# Patient Record
Sex: MALE | Race: WHITE | NOT HISPANIC OR LATINO | Employment: OTHER | URBAN - METROPOLITAN AREA
[De-identification: names, ages, dates, MRNs, and addresses within clinical notes are randomized per-mention and may not be internally consistent; named-entity substitution may affect disease eponyms.]

---

## 2017-08-04 ENCOUNTER — OFFICE VISIT (OUTPATIENT)
Dept: URGENT CARE | Facility: CLINIC | Age: 52
End: 2017-08-04
Payer: MEDICARE

## 2017-08-04 VITALS
HEIGHT: 68 IN | OXYGEN SATURATION: 97 % | DIASTOLIC BLOOD PRESSURE: 83 MMHG | SYSTOLIC BLOOD PRESSURE: 127 MMHG | TEMPERATURE: 98 F | WEIGHT: 260 LBS | BODY MASS INDEX: 39.4 KG/M2 | HEART RATE: 91 BPM

## 2017-08-04 DIAGNOSIS — L23.7 CONTACT DERMATITIS DUE TO POISON IVY: Primary | ICD-10-CM

## 2017-08-04 PROCEDURE — 99203 OFFICE O/P NEW LOW 30 MIN: CPT | Mod: 25,S$GLB,, | Performed by: PHYSICIAN ASSISTANT

## 2017-08-04 PROCEDURE — 96372 THER/PROPH/DIAG INJ SC/IM: CPT | Mod: S$GLB,,, | Performed by: PHYSICIAN ASSISTANT

## 2017-08-04 RX ORDER — TRIAMCINOLONE ACETONIDE 1 MG/G
CREAM TOPICAL 2 TIMES DAILY
Qty: 80 G | Refills: 1 | Status: SHIPPED | OUTPATIENT
Start: 2017-08-04 | End: 2017-08-14

## 2017-08-04 RX ORDER — BETAMETHASONE SODIUM PHOSPHATE AND BETAMETHASONE ACETATE 3; 3 MG/ML; MG/ML
9 INJECTION, SUSPENSION INTRA-ARTICULAR; INTRALESIONAL; INTRAMUSCULAR; SOFT TISSUE
Status: COMPLETED | OUTPATIENT
Start: 2017-08-04 | End: 2017-08-04

## 2017-08-04 RX ORDER — PREDNISONE 20 MG/1
20 TABLET ORAL DAILY
Qty: 14 TABLET | Refills: 0 | Status: SHIPPED | OUTPATIENT
Start: 2017-08-04 | End: 2017-08-16

## 2017-08-04 RX ADMIN — BETAMETHASONE SODIUM PHOSPHATE AND BETAMETHASONE ACETATE 9 MG: 3; 3 INJECTION, SUSPENSION INTRA-ARTICULAR; INTRALESIONAL; INTRAMUSCULAR; SOFT TISSUE at 01:08

## 2017-08-04 NOTE — PATIENT INSTRUCTIONS
Poison Ivy Rash  You have a rash and itching. This is a delayed reaction to the oils of the poison ivy plant. You likely came in contact with it during the 3 days before your symptoms began. Your skin will become red and itchy. Small blisters may appear. These can break and leak a clear yellow fluid. This fluid is not contagious. The reaction usually starts to go away after 1 to 2 weeks. But it may take 4 to 6 weeks to fully clear.    Home care  Follow these guidelines when caring for yourself at home:  · The plant oils still on your skin or clothes can be spread to other places on your body. They can also be passed on to other people and cause a similar reaction. Thats why its important to wash all of the plant oils off your skin and any clothes that may have been exposed. Wash all clothes that you were wearing. Use hot water with ordinary laundry detergent.  · Don't use over-the-counter creams that have neomycin or bacitracin. These may make the rash worse.  · Avoid anything that heats up your skin. This includes hot showers or baths, or direct sunlight. These can make itching worse.  · Put a cold compress on areas that are leaking (weeping), or on blistered areas. Do this for 30 minutes 3 times a day. To make a cold compress, dip a wash cloth in a mixture of 1 pint of cold water and 1 packet of astringent or oatmeal bath powder. Keep the solution in the refrigerator for future use.  · If large areas of skin are affected, take a lukewarm bath. Add colloidal oatmeal, or 1 cup of cornstarch or baking soda to the water.  · For a rash in a smaller area, use hydrocortisone cream for redness and irritation. But dont use this if another medicine was prescribed. For severe itching, put an ice pack on the area. To make an ice pack, put ice cubes in a plastic bag that seals at the top. Wrap the bag in a clean, thin towel or cloth. Never put ice or an ice pack directly on the skin. Over-the-counter products that have  calamine lotion may also be helpful.  · You can also use an oral antihistamine medicine with diphenhydramine for itching, unless another medicine was prescribed. This medicine may make you sleepy. So use lower doses during the daytime and higher doses at bedtime. Dont use medicine that has diphenhydramine if you have glaucoma. Also dont use it if you are a man who has trouble urinating because of an enlarged prostate. Antihistamines with loratidine cause less drowsiness. They are a good choice for daytime use.  · For severe cases, your provider may prescribe oral steroid medicines. Always take these exactly as prescribed.  Follow-up care  Follow up with your healthcare provider, or as directed. Call your provider if your rash gets worse or you are not starting to get better after 1 week of treatment.  When to seek medical advice  Call your healthcare provider right away if any of these occur:  · Spreading facial rash with swollen mouth or eyelids  · Rash that spreads to the groin and causes swelling of the penis, scrotum, or vaginal area  · Trouble urinating because of swelling in the genital area  Also call your provider if you have signs of infection in the areas of broken blisters:  · Spreading redness  · Pus or fluid draining from the blisters  · Yellow-brown crusts form over the open blisters  · Fever of 1 degree, or higher, above your normal temperature, or as directed by your provider  Call 911  Call 911 if you have severe swelling on your face, eyelids, mouth, throat, or tongue.  Date Last Reviewed: 8/1/2016  © 8973-0561 [employer]. [address] [place] [address], [place] [address]. All rights reserved. This information is not intended as a substitute for professional medical care. Always follow your healthcare professional's instructions.      Please follow up with your Primary care provider within 2-5 days if your signs ans symptoms have not resolved or worsen.     If your condition worsens or  fails to improve we recommend that you receive another evaluation at the emergency room immediately or contact your primary medical clinic to discuss your concerns.   You must understand that you have received an Urgent Care treatment only and that you may be released before all of your medical problems are known or treated. You, the patient, will arrange for follow up care as instructed.

## 2017-08-04 NOTE — PROGRESS NOTES
"Subjective:       Patient ID: Maximino Marinelli is a 51 y.o. male.    Vitals:  height is 5' 8" (1.727 m) and weight is 117.9 kg (260 lb). His temperature is 98.4 °F (36.9 °C). His blood pressure is 127/83 and his pulse is 91. His oxygen saturation is 97%.     Chief Complaint: Rash    Rash   This is a new problem. The current episode started in the past 7 days. The problem has been gradually worsening since onset. The affected locations include the left hand, left arm, right hand, right arm, right ankle, left ankle, abdomen and back. The rash is characterized by itchiness. He was exposed to plant contact (poison ivy). Pertinent negatives include no diarrhea, facial edema, fatigue, fever, joint pain, shortness of breath, sore throat or vomiting. Past treatments include anti-itch cream, antibiotic cream and topical steroids. The treatment provided mild relief.     Review of Systems   Constitution: Negative for chills, fatigue and fever.   HENT: Negative for headaches and sore throat.    Eyes: Negative for blurred vision.   Cardiovascular: Negative for chest pain.   Respiratory: Negative for shortness of breath.    Skin: Positive for itching and rash.   Musculoskeletal: Negative for back pain and joint pain.   Gastrointestinal: Negative for abdominal pain, diarrhea, nausea and vomiting.   Psychiatric/Behavioral: The patient is not nervous/anxious.        Objective:      Physical Exam   Constitutional: He is oriented to person, place, and time. He appears well-developed and well-nourished. He is cooperative.  Non-toxic appearance. He does not appear ill. No distress.   HENT:   Head: Normocephalic and atraumatic. Head is without abrasion, without contusion and without laceration.   Right Ear: Hearing, tympanic membrane, external ear and ear canal normal.   Left Ear: Hearing, tympanic membrane, external ear and ear canal normal.   Nose: Nose normal. No mucosal edema, rhinorrhea or nasal deformity. No epistaxis. Right sinus " exhibits no maxillary sinus tenderness and no frontal sinus tenderness. Left sinus exhibits no maxillary sinus tenderness and no frontal sinus tenderness.   Mouth/Throat: Uvula is midline, oropharynx is clear and moist and mucous membranes are normal. No trismus in the jaw. Normal dentition. No uvula swelling. No posterior oropharyngeal erythema.   Eyes: Conjunctivae, EOM and lids are normal. Pupils are equal, round, and reactive to light. Right eye exhibits no discharge. Left eye exhibits no discharge. No scleral icterus.   Sclera clear bilat   Neck: Trachea normal, normal range of motion, full passive range of motion without pain and phonation normal. Neck supple.   Cardiovascular: Normal rate, regular rhythm, normal heart sounds, intact distal pulses and normal pulses.    Pulmonary/Chest: Effort normal and breath sounds normal. No stridor. No respiratory distress.   Abdominal: Soft. Normal appearance and bowel sounds are normal. He exhibits no distension, no pulsatile midline mass and no mass. There is no tenderness.   Musculoskeletal: Normal range of motion. He exhibits no edema or deformity.   Neurological: He is alert and oriented to person, place, and time. He exhibits normal muscle tone. Coordination normal.   Skin: Skin is warm, dry and intact. Capillary refill takes less than 2 seconds. Rash noted. No abrasion, no bruising, no burn, no ecchymosis, no laceration, no lesion and no purpura noted. Rash is maculopapular and vesicular. Rash is not macular, not papular, not nodular, not pustular and not urticarial. He is not diaphoretic. No erythema. No pallor.        Several sporadic regions of erythematous vesicular rashes with severe prurititis with oozing     No edema or warmth. No facial involvement. No signs of infections or purulence.    Psychiatric: He has a normal mood and affect. His speech is normal and behavior is normal. Judgment and thought content normal. Cognition and memory are normal.   Nursing  note and vitals reviewed.      Assessment:       1. Contact dermatitis due to poison ivy        Plan:         Contact dermatitis due to poison ivy  -     betamethasone acetate-betamethasone sodium phosphate injection 9 mg; Inject 1.5 mLs (9 mg total) into the muscle one time.  -     triamcinolone acetonide 0.1% (KENALOG) 0.1 % cream; Apply topically 2 (two) times daily.  Dispense: 80 g; Refill: 1  -     predniSONE (DELTASONE) 20 MG tablet; Take 1 tablet (20 mg total) by mouth once daily. Take 2 tablets PO once daily for 4 days followed by 1 tablet PO once daily for 4 days followed by half a tablet PO once daily for 4 days  Dispense: 14 tablet; Refill: 0

## 2018-04-08 ENCOUNTER — HOSPITAL ENCOUNTER (EMERGENCY)
Facility: HOSPITAL | Age: 53
Discharge: HOME | End: 2018-04-08
Payer: MEDICARE

## 2018-04-08 VITALS
OXYGEN SATURATION: 97 % | HEIGHT: 68 IN | WEIGHT: 275 LBS | DIASTOLIC BLOOD PRESSURE: 70 MMHG | RESPIRATION RATE: 18 BRPM | TEMPERATURE: 97 F | BODY MASS INDEX: 41.68 KG/M2 | HEART RATE: 82 BPM | SYSTOLIC BLOOD PRESSURE: 132 MMHG

## 2018-04-08 DIAGNOSIS — S61.215A LACERATION OF LEFT RING FINGER WITHOUT FOREIGN BODY WITHOUT DAMAGE TO NAIL, INITIAL ENCOUNTER: Primary | ICD-10-CM

## 2018-04-08 PROCEDURE — 63600175 PHARM REV CODE 636 W HCPCS

## 2018-04-08 PROCEDURE — 90471 IMMUNIZATION ADMIN: CPT

## 2018-04-08 PROCEDURE — 99284 EMERGENCY DEPT VISIT MOD MDM: CPT | Mod: 25

## 2018-04-08 PROCEDURE — 12041 INTMD RPR N-HF/GENIT 2.5CM/<: CPT | Mod: F3

## 2018-04-08 PROCEDURE — 90715 TDAP VACCINE 7 YRS/> IM: CPT

## 2018-04-08 PROCEDURE — 25000003 PHARM REV CODE 250

## 2018-04-08 RX ORDER — CEPHALEXIN 500 MG/1
500 CAPSULE ORAL 4 TIMES DAILY
Qty: 20 CAPSULE | Refills: 0 | Status: SHIPPED | OUTPATIENT
Start: 2018-04-08 | End: 2018-04-13

## 2018-04-08 RX ORDER — LIDOCAINE HYDROCHLORIDE AND EPINEPHRINE 10; 10 MG/ML; UG/ML
1 INJECTION, SOLUTION INFILTRATION; PERINEURAL ONCE
Status: DISCONTINUED | OUTPATIENT
Start: 2018-04-08 | End: 2018-04-08

## 2018-04-08 RX ORDER — LIDOCAINE HYDROCHLORIDE AND EPINEPHRINE 10; 10 MG/ML; UG/ML
1 INJECTION, SOLUTION INFILTRATION; PERINEURAL ONCE
Status: COMPLETED | OUTPATIENT
Start: 2018-04-08 | End: 2018-04-08

## 2018-04-08 RX ORDER — HYDROCODONE BITARTRATE AND ACETAMINOPHEN 5; 325 MG/1; MG/1
1 TABLET ORAL
Status: COMPLETED | OUTPATIENT
Start: 2018-04-08 | End: 2018-04-08

## 2018-04-08 RX ORDER — IBUPROFEN 600 MG/1
600 TABLET ORAL EVERY 6 HOURS PRN
Qty: 20 TABLET | Refills: 0 | OUTPATIENT
Start: 2018-04-08 | End: 2018-10-14

## 2018-04-08 RX ORDER — HYDROCODONE BITARTRATE AND ACETAMINOPHEN 5; 325 MG/1; MG/1
1 TABLET ORAL EVERY 6 HOURS PRN
Qty: 12 TABLET | Refills: 0 | Status: SHIPPED | OUTPATIENT
Start: 2018-04-08 | End: 2018-10-14

## 2018-04-08 RX ADMIN — HYDROCODONE BITARTRATE AND ACETAMINOPHEN 1 TABLET: 5; 325 TABLET ORAL at 09:04

## 2018-04-08 RX ADMIN — LIDOCAINE HYDROCHLORIDE,EPINEPHRINE BITARTRATE 10 ML: 10; .01 INJECTION, SOLUTION INFILTRATION; PERINEURAL at 09:04

## 2018-04-08 RX ADMIN — BACITRACIN, NEOMYCIN, POLYMYXIN B 1 EACH: 400; 3.5; 5 OINTMENT TOPICAL at 10:04

## 2018-04-08 RX ADMIN — CLOSTRIDIUM TETANI TOXOID ANTIGEN (FORMALDEHYDE INACTIVATED), CORYNEBACTERIUM DIPHTHERIAE TOXOID ANTIGEN (FORMALDEHYDE INACTIVATED), BORDETELLA PERTUSSIS TOXOID ANTIGEN (GLUTARALDEHYDE INACTIVATED), BORDETELLA PERTUSSIS FILAMENTOUS HEMAGGLUTININ ANTIGEN (FORMALDEHYDE INACTIVATED), BORDETELLA PERTUSSIS PERTACTIN ANTIGEN, AND BORDETELLA PERTUSSIS FIMBRIAE 2/3 ANTIGEN 0.5 ML: 5; 2; 2.5; 5; 3; 5 INJECTION, SUSPENSION INTRAMUSCULAR at 09:04

## 2018-04-09 NOTE — ED PROVIDER NOTES
"Encounter Date: 4/8/2018    SCRIBE #1 NOTE: I, Domenic Pozo, am scribing for, and in the presence of,  Dr. Shaffer. I have scribed the entire note.       History     Chief Complaint   Patient presents with    Laceration     Patient presents to the ED with a laceration to the poster area of the 4th digit on the left hand. States having cut it accidentally while "cutting weeds".      Time patient was seen by the provider: 8:41 PM      The patient is a 52 y.o. male who presents to the ED with a complaint of laceration to the left ring finger by knife wound. He states that he was cutting weeds today and accidentally cut himself.  He attempted to control bleeding at home without success.  He does not recall his last tetanus shot.  No palliative or provocative factors except as noted.  Denies loss of function.          Review of patient's allergies indicates:  No Known Allergies  Past Medical History:   Diagnosis Date    Arthritis     Hypercholesteremia      Past Surgical History:   Procedure Laterality Date    ANKLE FUSION      carpel tunnel      HERNIA REPAIR      INGUINAL HERNIA REPAIR      KNEE ARTHROSCOPY Right     ORTHOPEDIC SURGERY       Family History   Problem Relation Age of Onset    Diabetes Father      Social History   Substance Use Topics    Smoking status: Former Smoker     Packs/day: 0.50     Years: 20.00     Start date: 6/16/1994     Quit date: 5/16/2014    Smokeless tobacco: Not on file    Alcohol use No     Review of Systems   Skin: Positive for wound (L ring finger).   All other systems reviewed and are negative.      Physical Exam     Initial Vitals [04/08/18 1953]   BP Pulse Resp Temp SpO2   131/84 97 18 98.3 °F (36.8 °C) 96 %      MAP       99.67         Physical Exam    Constitutional: He appears well-developed.   HENT:   Head: Normocephalic and atraumatic.   Eyes: EOM are normal.   Neck: Neck supple.   Pulmonary/Chest: No respiratory distress.   Abdominal: He exhibits no distension. "   Musculoskeletal:   2 cm avulsion to left ring finger across PIP joint.  distal neurovascular status intact   Neurological:   Intact to screening   Skin: Skin is dry.   Psychiatric: He has a normal mood and affect.         ED Course   Lac Repair  Date/Time: 4/8/2018 10:07 PM  Performed by: BILLY PEREZ  Authorized by: BILLY PEREZ   Location: L ring finger evulsion.  Laceration length: 2 cm  Foreign bodies: no foreign bodies  Tendon involvement: none  Nerve involvement: none  Vascular damage: no  Anesthesia: digital block (and local infiltration)    Anesthesia:  Local Anesthetic: lidocaine 1% with epinephrine  Anesthetic total: 5 mL  Patient sedated: no  Preparation: Patient was prepped and draped in the usual sterile fashion.  Irrigation solution: saline  Irrigation method: tap  Amount of cleaning: extensive  Debridement: none  Degree of undermining: none  Skin closure: 4-0 nylon  Number of sutures: 8  Technique: running  Approximation: close  Approximation difficulty: simple  Patient tolerance: Patient tolerated the procedure well with no immediate complications        Labs Reviewed - No data to display          Medical Decision Making:   ED Management:  9:45 PM Lac repair    10:11 PM  Patient is nontoxic appearing in the ED.  No persistent emergent issues detected.  Exam benign.  We will discharge home to follow up with PCP.  Patient will return to the ED as needed for any deterioration or any other concerns.  Verbal discharge instructions and return precautions given. Pt tolerated lac repair well. Good approximation noted. Pt to return in 14 days for suture removal or to return sooner for complications or other problems.                       Clinical Impression:   The encounter diagnosis was Laceration of left ring finger without foreign body without damage to nail, initial encounter.    Disposition:   Disposition: Discharged  Condition: Stable         I, Billy Perez personally performed the  services described in this documentation. All medical record entries made by the scribe were at my direction and in my presence.  I have reviewed the chart and agree that the record reflects my personal performance and is accurate and complete. Phan Shaffer M.D.                   Phan Shaffer MD  04/09/18 0512

## 2018-04-09 NOTE — ED TRIAGE NOTES
Patient in Exam room #11 AAOx3. Up in bed with visible laceration to left ringer finger of 4th digit to left hand. Awaiting Md.

## 2018-10-14 ENCOUNTER — HOSPITAL ENCOUNTER (EMERGENCY)
Facility: HOSPITAL | Age: 53
Discharge: HOME | End: 2018-10-14
Attending: EMERGENCY MEDICINE
Payer: MEDICARE

## 2018-10-14 VITALS
TEMPERATURE: 98 F | SYSTOLIC BLOOD PRESSURE: 122 MMHG | HEIGHT: 68 IN | RESPIRATION RATE: 18 BRPM | DIASTOLIC BLOOD PRESSURE: 85 MMHG | OXYGEN SATURATION: 99 % | HEART RATE: 96 BPM | BODY MASS INDEX: 43.19 KG/M2 | WEIGHT: 285 LBS

## 2018-10-14 DIAGNOSIS — M54.50 ACUTE LOW BACK PAIN WITHOUT SCIATICA, UNSPECIFIED BACK PAIN LATERALITY: ICD-10-CM

## 2018-10-14 DIAGNOSIS — S99.912A LEFT ANKLE INJURY, INITIAL ENCOUNTER: ICD-10-CM

## 2018-10-14 DIAGNOSIS — S92.002A CLOSED NONDISPLACED FRACTURE OF LEFT CALCANEUS, UNSPECIFIED PORTION OF CALCANEUS, INITIAL ENCOUNTER: Primary | ICD-10-CM

## 2018-10-14 DIAGNOSIS — M79.672 LEFT FOOT PAIN: ICD-10-CM
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
## 2018-10-14 PROCEDURE — 96372 THER/PROPH/DIAG INJ SC/IM: CPT | Mod: 59

## 2018-10-14 PROCEDURE — 96375 TX/PRO/DX INJ NEW DRUG ADDON: CPT | Mod: 59

## 2018-10-14 PROCEDURE — 96374 THER/PROPH/DIAG INJ IV PUSH: CPT | Mod: 59

## 2018-10-14 PROCEDURE — 63600175 PHARM REV CODE 636 W HCPCS: Performed by: EMERGENCY MEDICINE

## 2018-10-14 PROCEDURE — 29515 APPLICATION SHORT LEG SPLINT: CPT | Mod: LT

## 2018-10-14 PROCEDURE — 99284 EMERGENCY DEPT VISIT MOD MDM: CPT | Mod: 25

## 2018-10-14 RX ORDER — HALOPERIDOL 5 MG/ML
5 INJECTION INTRAMUSCULAR
Status: COMPLETED | OUTPATIENT
Start: 2018-10-14 | End: 2018-10-14

## 2018-10-14 RX ORDER — IBUPROFEN 600 MG/1
600 TABLET ORAL EVERY 6 HOURS PRN
Qty: 30 TABLET | Refills: 0 | Status: SHIPPED | OUTPATIENT
Start: 2018-10-14

## 2018-10-14 RX ORDER — HYDROCODONE BITARTRATE AND ACETAMINOPHEN 5; 325 MG/1; MG/1
TABLET ORAL
Qty: 30 TABLET | Refills: 0 | Status: SHIPPED | OUTPATIENT
Start: 2018-10-14

## 2018-10-14 RX ORDER — HYDROMORPHONE HYDROCHLORIDE 1 MG/ML
1 INJECTION, SOLUTION INTRAMUSCULAR; INTRAVENOUS; SUBCUTANEOUS
Status: COMPLETED | OUTPATIENT
Start: 2018-10-14 | End: 2018-10-14

## 2018-10-14 RX ORDER — KETOROLAC TROMETHAMINE 30 MG/ML
10 INJECTION, SOLUTION INTRAMUSCULAR; INTRAVENOUS
Status: COMPLETED | OUTPATIENT
Start: 2018-10-14 | End: 2018-10-14

## 2018-10-14 RX ADMIN — HALOPERIDOL LACTATE 5 MG: 5 INJECTION, SOLUTION INTRAMUSCULAR at 08:10

## 2018-10-14 RX ADMIN — KETOROLAC TROMETHAMINE 10 MG: 30 INJECTION, SOLUTION INTRAMUSCULAR at 08:10

## 2018-10-14 RX ADMIN — HYDROMORPHONE HYDROCHLORIDE 1 MG: 1 INJECTION, SOLUTION INTRAMUSCULAR; INTRAVENOUS; SUBCUTANEOUS at 06:10

## 2018-10-14 RX ADMIN — HYDROMORPHONE HYDROCHLORIDE 1 MG: 1 INJECTION, SOLUTION INTRAMUSCULAR; INTRAVENOUS; SUBCUTANEOUS at 08:10

## 2018-10-14 NOTE — ED PROVIDER NOTES
Encounter Date: 10/14/2018    SCRIBE #1 NOTE: I, Jazz Lentz, am scribing for, and in the presence of,  Dr. Oneal. I have scribed the entire note.       History     Chief Complaint   Patient presents with    Ankle Pain     left ankle occurred aprx 30mins pta fell off ladder landing on left ankle denies hitting head or loc +swelling -numbness-tingling     Maximino Marinelli is a 52 y.o. male who  has a past medical history of Arthritis and Hypercholesteremia.    The patient presents to the ED due to acute traumatic left ankle pain s/p fall. He reports onset of incident was just prior to arrival. The patient states he was standing at the top of a 20 foot ladder when he lost his balance while holding a branch. He notes he landed on his left foot. The patient denies any LOC or head injury associated with the fall. He notes associated swelling at the ankle and low back pain but denies any open wounds or redness. The patient denies use of any medications for pain.      The history is provided by the patient.     Review of patient's allergies indicates:  No Known Allergies  Past Medical History:   Diagnosis Date    Arthritis     Hypercholesteremia      Past Surgical History:   Procedure Laterality Date    ANKLE FUSION      ARTHROSCOPY-KNEE Left 3/16/2015    [facility]    ARTHROSCOPY-KNEE Right 6/18/2014    [facility]    carpel tunnel      HERNIA REPAIR      INGUINAL HERNIA REPAIR      KNEE ARTHROSCOPY Right     MENISCECTOMY Left 3/16/2015    [facility]    MENISCECTOMY Right 6/18/2014    [facility]    ORTHOPEDIC SURGERY      TRANSESOPHAGEAL ECHOCARDIOGRAM (XAVIER) N/A 12/9/2013    Performed by [facility]     Family History   Problem Relation Age of Onset    Diabetes Father      Social History     Tobacco Use    Smoking status: Former Smoker     Packs/day: 0.50     Years: 20.00  "    Pack years: 10.00     Start date: 1994     Last attempt to quit: 2014     Years since quittin.4   Substance Use Topics    Alcohol use: No    Drug use: Yes     Types: "Crack" cocaine, Marijuana, Other-see comments     Review of Systems   Musculoskeletal: Positive for arthralgias, back pain and joint swelling.        Left ankle pain and swelling   Skin: Negative for wound.   All other systems reviewed and are negative.      Physical Exam     Initial Vitals [10/14/18 1810]   BP Pulse Resp Temp SpO2   99/60 99 18 97.7 °F (36.5 °C) 99 %      MAP       --         Physical Exam    Nursing note and vitals reviewed.  Constitutional: He appears well-developed and well-nourished. He appears distressed.   HENT:   Head: Normocephalic and atraumatic.   Eyes: Conjunctivae are normal.   Neck: Normal range of motion.   Cardiovascular: Normal rate, regular rhythm, normal heart sounds and intact distal pulses.   No murmur heard.  Pulmonary/Chest: Breath sounds normal. No respiratory distress.   Abdominal: Soft. Bowel sounds are normal. He exhibits no distension. There is no tenderness.   Musculoskeletal: He exhibits edema and tenderness.   LLE: Bruising and swelling to the left medial ankle.  Unwilling to range ankle.  No gross deformity.  No proximal tibial tenderness with palpation.   Neurological: He is alert and oriented to person, place, and time. He has normal strength. No sensory deficit.   Skin: Skin is warm and dry.   Psychiatric: He has a normal mood and affect. His behavior is normal.         ED Course   Splint Application  Date/Time: 10/14/2018 9:19 PM  Performed by: Nori Oneal MD  Authorized by: Nori Oneal MD   Location details: left ankle  Splint type: short leg  Supplies used: aluminum splint,  cotton padding and Ortho-Glass  Post-procedure: The splinted body part was neurovascularly unchanged following the procedure.  Patient tolerance: Patient tolerated the procedure well with no immediate " complications        Labs Reviewed - No data to display       Imaging Results          X-Ray Foot Complete Left (Final result)  Result time 10/14/18 20:19:09    Final result by Ben Bland MD (10/14/18 20:19:09)                 Impression:      See above.      Electronically signed by: Ben Bland MD  Date:    10/14/2018  Time:    20:19             Narrative:    EXAMINATION:  XR FOOT COMPLETE 3 VIEW LEFT    CLINICAL HISTORY:  .  Pain in left foot    TECHNIQUE:  AP, lateral and oblique views of the left foot were performed.    COMPARISON:  19:14.    FINDINGS:  Redemonstration of acute comminuted calcaneal fracture.  No additional fractures or dislocation seen.  Lisfranc articulation appears congruent.  Prominent soft tissue swelling is seen about the ankle.                               X-Ray Ankle Complete Left (Final result)  Result time 10/14/18 19:40:51    Final result by Ben Bland MD (10/14/18 19:40:51)                 Impression:      Acute comminuted calcaneal fracture.      Electronically signed by: Ben Bland MD  Date:    10/14/2018  Time:    19:40             Narrative:    EXAMINATION:  XR ANKLE COMPLETE 3 VIEW LEFT    CLINICAL HISTORY:  Unspecified injury of left ankle, initial encounter    TECHNIQUE:  AP, lateral and oblique views of the left ankle were performed.    COMPARISON:  None    FINDINGS:  Acute comminuted fracture is seen involving the calcaneus with fracture planes likely extending to involve the subtalar joint and calcaneocuboid joint.  No evidence of distal tibia or fibular fracture.  Ankle mortise is maintained.  Prominent tissue swelling is seen about the ankle.  Posterior and plantar calcaneal spurring is also noted.                               X-Ray Lumbar Spine Ap And Lateral (Final result)  Result time 10/14/18 19:42:22    Final result by Ben Bland MD (10/14/18 19:42:22)                 Impression:      No acute lumbar spine abnormalities  identified.      Electronically signed by: Ben Bland MD  Date:    10/14/2018  Time:    19:42             Narrative:    EXAMINATION:  XR LUMBAR SPINE AP AND LATERAL    CLINICAL HISTORY:  fall with low back pain;Unspecified fall, initial encounter    TECHNIQUE:  AP, lateral and spot images were performed of the lumbar spine.    COMPARISON:  None    FINDINGS:  Lumbar spine alignment is within normal limits.  No evidence of acute lumbar spine fracture or subluxation.  Prominent anterior bridging marginal osteophytes are seen throughout the lower visualized thoracic spine and lumbar levels.  There is lower lumbar facet arthropathy also seen.  Visualized sacrum shows no significant abnormalities.                                 Medical Decision Making:   Clinical Tests:   Radiological Study: Ordered and Reviewed  ED Management:  52M with fall from ladder with lower back pain and left ankle pain.  NVI.  Xrays show left calcaneal fracture only.  No lumbar fractures.  Will splint and send home with pain meds with referral to Ortho for follow up.                      Clinical Impression:     1. Closed nondisplaced fracture of left calcaneus, unspecified portion of calcaneus, initial encounter    2. Left ankle injury, initial encounter    3. Fall    4. Left foot pain    5. Acute low back pain without sciatica, unspecified back pain laterality           I, Dr. Nori Oneal, personally performed the services described in this documentation.   All medical record entries made by the scribe were at my direction and in my presence.   I have reviewed the chart and agree that the record is accurate and complete.   Nori Oneal MD.  9:19 PM 10/14/2018                       Nori Oneal MD  10/14/18 1765

## 2018-10-15 NOTE — ED NOTES
Report received, pt care assumed. Pt thrashing in bed, kicking left leg in air c/o pain 10/10 to ankle. Dr. Oneal notified. Pt and family updated on result wait times and plan of care. Will continue to monitor.

## 2018-10-15 NOTE — DISCHARGE INSTRUCTIONS
Elevate and ice foot.  Take medications as directed.  No weight bearing on foot.  FOllow up with your Ortho doctor this week.

## 2018-10-15 NOTE — ED NOTES
Short leg splint applied to LLE, pt tolerated well. Dr. Oneal at bedside to evaluate splint. Splint approved by MD. Pt provided with crutches and teaching.

## 2024-06-03 ENCOUNTER — HOSPITAL ENCOUNTER (INPATIENT)
Facility: HOSPITAL | Age: 59
LOS: 1 days | Discharge: HOME | DRG: 603 | End: 2024-06-05
Attending: FAMILY MEDICINE | Admitting: HOSPITALIST
Payer: MEDICARE

## 2024-06-03 DIAGNOSIS — R60.0 EDEMA OF RIGHT LOWER EXTREMITY: ICD-10-CM

## 2024-06-03 DIAGNOSIS — L03.115 CELLULITIS OF RIGHT LOWER EXTREMITY: Primary | ICD-10-CM

## 2024-06-03 DIAGNOSIS — M79.3 LIPODERMATOSCLEROSIS OF BOTH LOWER EXTREMITIES: ICD-10-CM

## 2024-06-03 DIAGNOSIS — L03.90 WOUND CELLULITIS: ICD-10-CM

## 2024-06-03 DIAGNOSIS — S81.801A WOUND OF RIGHT LEG: ICD-10-CM

## 2024-06-03 LAB
ALBUMIN SERPL BCP-MCNC: 3.4 G/DL (ref 3.5–5.2)
ALP SERPL-CCNC: 78 U/L (ref 55–135)
ALT SERPL W/O P-5'-P-CCNC: 10 U/L (ref 10–44)
AMPHET+METHAMPHET UR QL: NEGATIVE
ANION GAP SERPL CALC-SCNC: 9 MMOL/L (ref 8–16)
AST SERPL-CCNC: 12 U/L (ref 10–40)
BARBITURATES UR QL SCN>200 NG/ML: NEGATIVE
BASOPHILS # BLD AUTO: 0.02 K/UL (ref 0–0.2)
BASOPHILS NFR BLD: 0.2 % (ref 0–1.9)
BENZODIAZ UR QL SCN>200 NG/ML: ABNORMAL
BILIRUB SERPL-MCNC: 0.5 MG/DL (ref 0.1–1)
BILIRUB UR QL STRIP: NEGATIVE
BUN SERPL-MCNC: 10 MG/DL (ref 6–20)
BZE UR QL SCN: ABNORMAL
CALCIUM SERPL-MCNC: 10 MG/DL (ref 8.7–10.5)
CANNABINOIDS UR QL SCN: NEGATIVE
CHLORIDE SERPL-SCNC: 102 MMOL/L (ref 95–110)
CLARITY UR: CLEAR
CO2 SERPL-SCNC: 29 MMOL/L (ref 23–29)
COLOR UR: YELLOW
CREAT SERPL-MCNC: 1 MG/DL (ref 0.5–1.4)
CREAT UR-MCNC: 97.5 MG/DL (ref 23–375)
CRP SERPL-MCNC: 49.8 MG/L (ref 0–8.2)
DIFFERENTIAL METHOD BLD: ABNORMAL
EOSINOPHIL # BLD AUTO: 0.1 K/UL (ref 0–0.5)
EOSINOPHIL NFR BLD: 0.8 % (ref 0–8)
ERYTHROCYTE [DISTWIDTH] IN BLOOD BY AUTOMATED COUNT: 12.9 % (ref 11.5–14.5)
EST. GFR  (NO RACE VARIABLE): >60 ML/MIN/1.73 M^2
GLUCOSE SERPL-MCNC: 136 MG/DL (ref 70–110)
GLUCOSE UR QL STRIP: NEGATIVE
HCT VFR BLD AUTO: 47.5 % (ref 40–54)
HGB BLD-MCNC: 15.9 G/DL (ref 14–18)
HGB UR QL STRIP: NEGATIVE
IMM GRANULOCYTES # BLD AUTO: 0.02 K/UL (ref 0–0.04)
IMM GRANULOCYTES NFR BLD AUTO: 0.2 % (ref 0–0.5)
KETONES UR QL STRIP: NEGATIVE
LACTATE SERPL-SCNC: 1.3 MMOL/L (ref 0.5–2.2)
LEUKOCYTE ESTERASE UR QL STRIP: NEGATIVE
LYMPHOCYTES # BLD AUTO: 1.2 K/UL (ref 1–4.8)
LYMPHOCYTES NFR BLD: 13.7 % (ref 18–48)
MCH RBC QN AUTO: 29.4 PG (ref 27–31)
MCHC RBC AUTO-ENTMCNC: 33.5 G/DL (ref 32–36)
MCV RBC AUTO: 88 FL (ref 82–98)
METHADONE UR QL SCN>300 NG/ML: NEGATIVE
MONOCYTES # BLD AUTO: 0.7 K/UL (ref 0.3–1)
MONOCYTES NFR BLD: 8.3 % (ref 4–15)
NEUTROPHILS # BLD AUTO: 6.8 K/UL (ref 1.8–7.7)
NEUTROPHILS NFR BLD: 76.8 % (ref 38–73)
NITRITE UR QL STRIP: NEGATIVE
NRBC BLD-RTO: 0 /100 WBC
OPIATES UR QL SCN: NEGATIVE
PCP UR QL SCN>25 NG/ML: NEGATIVE
PH UR STRIP: 8 [PH] (ref 5–8)
PLATELET # BLD AUTO: 287 K/UL (ref 150–450)
PMV BLD AUTO: 9 FL (ref 9.2–12.9)
POCT GLUCOSE: 122 MG/DL (ref 70–110)
POTASSIUM SERPL-SCNC: 4.2 MMOL/L (ref 3.5–5.1)
PROT SERPL-MCNC: 7.8 G/DL (ref 6–8.4)
PROT UR QL STRIP: NEGATIVE
RBC # BLD AUTO: 5.41 M/UL (ref 4.6–6.2)
SODIUM SERPL-SCNC: 140 MMOL/L (ref 136–145)
SP GR UR STRIP: 1.01 (ref 1–1.03)
TOXICOLOGY INFORMATION: ABNORMAL
URN SPEC COLLECT METH UR: NORMAL
UROBILINOGEN UR STRIP-ACNC: NEGATIVE EU/DL
WBC # BLD AUTO: 8.79 K/UL (ref 3.9–12.7)

## 2024-06-03 PROCEDURE — 87040 BLOOD CULTURE FOR BACTERIA: CPT

## 2024-06-03 PROCEDURE — 83605 ASSAY OF LACTIC ACID: CPT

## 2024-06-03 PROCEDURE — 25000003 PHARM REV CODE 250: Performed by: STUDENT IN AN ORGANIZED HEALTH CARE EDUCATION/TRAINING PROGRAM

## 2024-06-03 PROCEDURE — G0378 HOSPITAL OBSERVATION PER HR: HCPCS

## 2024-06-03 PROCEDURE — 96366 THER/PROPH/DIAG IV INF ADDON: CPT

## 2024-06-03 PROCEDURE — 87186 SC STD MICRODIL/AGAR DIL: CPT

## 2024-06-03 PROCEDURE — 80307 DRUG TEST PRSMV CHEM ANLYZR: CPT

## 2024-06-03 PROCEDURE — 85025 COMPLETE CBC W/AUTO DIFF WBC: CPT

## 2024-06-03 PROCEDURE — 96365 THER/PROPH/DIAG IV INF INIT: CPT

## 2024-06-03 PROCEDURE — 99285 EMERGENCY DEPT VISIT HI MDM: CPT | Mod: 25

## 2024-06-03 PROCEDURE — 25000003 PHARM REV CODE 250: Performed by: FAMILY MEDICINE

## 2024-06-03 PROCEDURE — 87040 BLOOD CULTURE FOR BACTERIA: CPT | Mod: 59 | Performed by: STUDENT IN AN ORGANIZED HEALTH CARE EDUCATION/TRAINING PROGRAM

## 2024-06-03 PROCEDURE — 96372 THER/PROPH/DIAG INJ SC/IM: CPT | Performed by: STUDENT IN AN ORGANIZED HEALTH CARE EDUCATION/TRAINING PROGRAM

## 2024-06-03 PROCEDURE — 63600175 PHARM REV CODE 636 W HCPCS: Performed by: STUDENT IN AN ORGANIZED HEALTH CARE EDUCATION/TRAINING PROGRAM

## 2024-06-03 PROCEDURE — 87070 CULTURE OTHR SPECIMN AEROBIC: CPT

## 2024-06-03 PROCEDURE — 86140 C-REACTIVE PROTEIN: CPT

## 2024-06-03 PROCEDURE — 80053 COMPREHEN METABOLIC PANEL: CPT

## 2024-06-03 PROCEDURE — 63600175 PHARM REV CODE 636 W HCPCS: Performed by: FAMILY MEDICINE

## 2024-06-03 PROCEDURE — 81003 URINALYSIS AUTO W/O SCOPE: CPT | Mod: 59 | Performed by: STUDENT IN AN ORGANIZED HEALTH CARE EDUCATION/TRAINING PROGRAM

## 2024-06-03 PROCEDURE — 87077 CULTURE AEROBIC IDENTIFY: CPT

## 2024-06-03 RX ORDER — LORAZEPAM 1 MG/1
2 TABLET ORAL EVERY 4 HOURS PRN
Status: DISCONTINUED | OUTPATIENT
Start: 2024-06-03 | End: 2024-06-04

## 2024-06-03 RX ORDER — SODIUM CHLORIDE 0.9 % (FLUSH) 0.9 %
5 SYRINGE (ML) INJECTION
Status: DISCONTINUED | OUTPATIENT
Start: 2024-06-03 | End: 2024-06-05 | Stop reason: HOSPADM

## 2024-06-03 RX ORDER — SULFAMETHOXAZOLE AND TRIMETHOPRIM 800; 160 MG/1; MG/1
1 TABLET ORAL 2 TIMES DAILY
Status: DISCONTINUED | OUTPATIENT
Start: 2024-06-03 | End: 2024-06-04

## 2024-06-03 RX ORDER — GLUCAGON 1 MG
1 KIT INJECTION
Status: DISCONTINUED | OUTPATIENT
Start: 2024-06-03 | End: 2024-06-05 | Stop reason: HOSPADM

## 2024-06-03 RX ORDER — HEPARIN SODIUM 5000 [USP'U]/ML
5000 INJECTION, SOLUTION INTRAVENOUS; SUBCUTANEOUS EVERY 8 HOURS
Status: DISCONTINUED | OUTPATIENT
Start: 2024-06-03 | End: 2024-06-03

## 2024-06-03 RX ORDER — HEPARIN SODIUM 5000 [USP'U]/ML
7500 INJECTION, SOLUTION INTRAVENOUS; SUBCUTANEOUS EVERY 8 HOURS
Status: DISCONTINUED | OUTPATIENT
Start: 2024-06-03 | End: 2024-06-04

## 2024-06-03 RX ORDER — IBUPROFEN 200 MG
16 TABLET ORAL
Status: DISCONTINUED | OUTPATIENT
Start: 2024-06-03 | End: 2024-06-05 | Stop reason: HOSPADM

## 2024-06-03 RX ORDER — ONDANSETRON HYDROCHLORIDE 2 MG/ML
4 INJECTION, SOLUTION INTRAVENOUS EVERY 8 HOURS PRN
Status: DISCONTINUED | OUTPATIENT
Start: 2024-06-03 | End: 2024-06-05 | Stop reason: HOSPADM

## 2024-06-03 RX ORDER — TALC
6 POWDER (GRAM) TOPICAL NIGHTLY PRN
Status: DISCONTINUED | OUTPATIENT
Start: 2024-06-03 | End: 2024-06-05 | Stop reason: HOSPADM

## 2024-06-03 RX ORDER — ACETAMINOPHEN 325 MG/1
650 TABLET ORAL EVERY 4 HOURS PRN
Status: DISCONTINUED | OUTPATIENT
Start: 2024-06-03 | End: 2024-06-05 | Stop reason: HOSPADM

## 2024-06-03 RX ORDER — NALOXONE HCL 0.4 MG/ML
0.02 VIAL (ML) INJECTION
Status: DISCONTINUED | OUTPATIENT
Start: 2024-06-03 | End: 2024-06-05 | Stop reason: HOSPADM

## 2024-06-03 RX ORDER — INSULIN ASPART 100 [IU]/ML
0-5 INJECTION, SOLUTION INTRAVENOUS; SUBCUTANEOUS
Status: DISCONTINUED | OUTPATIENT
Start: 2024-06-03 | End: 2024-06-05 | Stop reason: HOSPADM

## 2024-06-03 RX ORDER — AMOXICILLIN 250 MG
1 CAPSULE ORAL 2 TIMES DAILY PRN
Status: DISCONTINUED | OUTPATIENT
Start: 2024-06-03 | End: 2024-06-05 | Stop reason: HOSPADM

## 2024-06-03 RX ORDER — IBUPROFEN 200 MG
24 TABLET ORAL
Status: DISCONTINUED | OUTPATIENT
Start: 2024-06-03 | End: 2024-06-05 | Stop reason: HOSPADM

## 2024-06-03 RX ADMIN — Medication 6 MG: at 08:06

## 2024-06-03 RX ADMIN — VANCOMYCIN HYDROCHLORIDE 2500 MG: 500 INJECTION, POWDER, LYOPHILIZED, FOR SOLUTION INTRAVENOUS at 06:06

## 2024-06-03 RX ADMIN — SULFAMETHOXAZOLE AND TRIMETHOPRIM 1 TABLET: 800; 160 TABLET ORAL at 04:06

## 2024-06-03 RX ADMIN — ACETAMINOPHEN 650 MG: 325 TABLET ORAL at 08:06

## 2024-06-03 RX ADMIN — ACETAMINOPHEN 650 MG: 325 TABLET ORAL at 04:06

## 2024-06-03 RX ADMIN — HEPARIN SODIUM 7500 UNITS: 5000 INJECTION INTRAVENOUS; SUBCUTANEOUS at 09:06

## 2024-06-03 NOTE — PROGRESS NOTES
"Pharmacokinetic Initial Assessment: IV Vancomycin    Assessment/Plan:    Initiate intravenous vancomycin with loading dose of 2500 mg once followed by a maintenance dose of vancomycin 2000 mg IV every 12 hours  Desired empiric serum trough concentration is 10 to 15 mcg/mL  Draw vancomycin trough level 60 min prior to fourth dose on 06/05/2024 at approximately 0600  Pharmacy will continue to follow and monitor vancomycin.      Please contact pharmacy at extension [phone] with any questions regarding this assessment.     Thank you for the consult,   Cleo Givens       Patient brief summary:  Maximino Marinelli is a 58 y.o. male initiated on antimicrobial therapy with IV Vancomycin for treatment of suspected skin & soft tissue infection    Drug Allergies:   Review of patient's allergies indicates:  No Known Allergies    Actual Body Weight:   136 kg    Renal Function:   Estimated Creatinine Clearance: 106 mL/min (based on SCr of 1 mg/dL).,     Dialysis Method (if applicable):  N/A    CBC (last 72 hours):  Recent Labs   Lab Result Units 06/03/24  1318   WBC K/uL 8.79   Hemoglobin g/dL 15.9   Hematocrit % 47.5   Platelets K/uL 287   Gran % % 76.8*   Lymph % % 13.7*   Mono % % 8.3   Eosinophil % % 0.8   Basophil % % 0.2   Differential Method  Automated       Metabolic Panel (last 72 hours):  Recent Labs   Lab Result Units 06/03/24  1318 06/03/24  1612   Sodium mmol/L 140  --    Potassium mmol/L 4.2  --    Chloride mmol/L 102  --    CO2 mmol/L 29  --    Glucose mg/dL 136*  --    Glucose, UA   --  Negative   BUN mg/dL 10  --    Creatinine mg/dL 1.0  --    Creatinine, Urine mg/dL  --  97.5   Albumin g/dL 3.4*  --    Total Bilirubin mg/dL 0.5  --    Alkaline Phosphatase U/L 78  --    AST U/L 12  --    ALT U/L 10  --        Drug levels (last 3 results):  No results for input(s): "VANCOMYCINRA", "VANCORANDOM", "VANCOMYCINPE", "VANCOPEAK", "VANCOMYCINTR", "VANCOTROUGH" in the last 72 hours.    Microbiologic " Results:  Microbiology Results (last 7 days)       Procedure Component Value Units Date/Time    Blood culture (site 2) [[id number]] Collected: 06/03/24 1526    Order Status: Sent Specimen: Blood from Peripheral, Upper Arm, Right     Blood culture (site 1) [[id number]] Collected: 06/03/24 1526    Order Status: Sent Specimen: Blood from Peripheral, Upper Arm, Right     Blood Culture #2 **CANNOT BE ORDERED STAT** [[id number]] Collected: 06/03/24 1347    Order Status: Sent Specimen: Blood from Peripheral, Antecubital, Right     Aerobic culture (Specify Source) **CANNOT BE ORDERED AS STAT** [[id number]] Collected: 06/03/24 1323    Order Status: Sent Specimen: Wound from Leg, Right Updated: 06/03/24 1328    Blood Culture #1 **CANNOT BE ORDERED STAT** [[id number]] Collected: 06/03/24 1319    Order Status: Sent Specimen: Blood from Peripheral, Antecubital, Left

## 2024-06-03 NOTE — Clinical Note
Diagnosis: Wound cellulitis [247466]   Future Attending Provider: MIN REYES [[id number]]   Special Needs:: No Special Needs [1]

## 2024-06-03 NOTE — ED TRIAGE NOTES
Pt presents to ED today c/o RLE swelling and reports discharge from leg x 6 mos  Pt denies fever , N/V, seeing provider for sx or taking medications for sx   NAD noted

## 2024-06-03 NOTE — PHARMACY MED REC
"[facility]           Pharmacy  Admission Medication History     The home medication history was taken by Olga Coppola.      Medication history obtained from Medications listed below were obtained from: Patient/family.Patient states he is not taking any medications    Based on information gathered for medication list, you may go to "Admission" then "Reconcile Home Medications" tabs to review and/or act upon those items.     The home medication list has been updated by the Pharmacy department.   Please read ALL comments highlighted in yellow.   Please address this information as you see fit.    Feel free to contact us if you have any questions or require assistance.    The medications listed below were removed from the home medication list.  Please reorder if appropriate:    Patient reports NOT TAKING the following medication(s):  Aspirin 81mg  Soma 350mg  Chantix   Norco 5-325mg  Motrin 600mg  Lidoderm patch  Multivitamin  Kenalog cream    No current facility-administered medications on file prior to encounter.     Current Outpatient Medications on File Prior to Encounter   Medication Sig Dispense Refill       Please address this information as you see fit.  Feel free to contact us if you have any questions or require assistance.    Olga Coppola  [phone]                  .          "

## 2024-06-03 NOTE — SUBJECTIVE & OBJECTIVE
Past Medical History:   Diagnosis Date    Arthritis     Hypercholesteremia        Past Surgical History:   Procedure Laterality Date    ANKLE FUSION      carpel tunnel      HERNIA REPAIR      INGUINAL HERNIA REPAIR      KNEE ARTHROSCOPY Right     ORTHOPEDIC SURGERY         Review of patient's allergies indicates:  No Known Allergies    No current facility-administered medications on file prior to encounter.     Current Outpatient Medications on File Prior to Encounter   Medication Sig    atorvastatin (LIPITOR) 10 MG tablet Take 1 tablet (10 mg total) by mouth once daily. (Patient not taking: Reported on 6/3/2024)    buPROPion (WELLBUTRIN XL) 150 MG TB24 tablet     gabapentin (NEURONTIN) 600 MG tablet Take 600 mg by mouth 2 (two) times daily.    [DISCONTINUED] aspirin 81 MG Chew Take 81 mg by mouth once daily.    [DISCONTINUED] carisoprodol (SOMA) 350 MG tablet Take 350 mg by mouth 4 (four) times daily as needed.    [DISCONTINUED] CHANTIX STARTING MONTH BOX 0.5 (11)-1 (42) mg tablet     [DISCONTINUED] esomeprazole (NEXIUM) 20 MG capsule Take 20 mg by mouth before breakfast.    [DISCONTINUED] HYDROcodone-acetaminophen (NORCO) 5-325 mg per tablet 1-2 tablets every 6 hours as needed for pain    [DISCONTINUED] ibuprofen (ADVIL,MOTRIN) 600 MG tablet Take 1 tablet (600 mg total) by mouth every 6 (six) hours as needed for Pain.    [DISCONTINUED] lidocaine (LIDODERM) 5 %(700 mg/patch) Place 1 patch onto the skin every 24 hours. Remove & Discard patch within 12 hours or as directed by MD    [DISCONTINUED] multivitamin (THERAGRAN) tablet Take 1 tablet by mouth once daily.    [DISCONTINUED] triamcinolone acetonide 0.1% (KENALOG) 0.1 % cream Apply topically 2 (two) times daily.     Family History       Problem Relation (Age of Onset)    Diabetes Father          Tobacco Use    Smoking status: Former     Current packs/day: 0.00     Average packs/day: 0.5 packs/day for 20.0 years (10.0 ttl pk-yrs)     Types: Cigarettes     Start  "date: 6/16/1994     Quit date: 5/16/2014     Years since quitting: 10.0    Smokeless tobacco: Not on file   Substance and Sexual Activity    Alcohol use: No    Drug use: Yes     Types: "Crack" cocaine, Marijuana, Other-see comments    Sexual activity: Not Currently     Partners: Female     Review of Systems   Constitutional:  Positive for activity change. Negative for appetite change, chills, fatigue, fever and unexpected weight change.   HENT: Negative.     Respiratory: Negative.     Cardiovascular: Negative.    Gastrointestinal: Negative.    Genitourinary: Negative.    Musculoskeletal: Negative.    Skin:  Positive for color change, rash and wound.   Neurological: Negative.      Objective:     Vital Signs (Most Recent):  Temp: 98.2 °F (36.8 °C) (06/03/24 1739)  Pulse: 96 (06/03/24 1739)  Resp: 20 (06/03/24 1739)  BP: 131/65 (06/03/24 1739)  SpO2: 98 % (06/03/24 1739) Vital Signs (24h Range):  Temp:  [98.2 °F (36.8 °C)-98.6 °F (37 °C)] 98.2 °F (36.8 °C)  Pulse:  [] 96  Resp:  [18-20] 20  SpO2:  [95 %-100 %] 98 %  BP: (123-133)/(65-80) 131/65     Weight: 136 kg (299 lb 13.2 oz)  Body mass index is 45.59 kg/m².     Physical Exam  Vitals and nursing note reviewed.   Constitutional:       General: He is not in acute distress.     Appearance: He is obese. He is ill-appearing.      Interventions: He is sedated.      Comments: Patient appeared to be under the influence of a substance. States he takes valium and cocaine at home.   HENT:      Head: Normocephalic and atraumatic.   Cardiovascular:      Rate and Rhythm: Normal rate and regular rhythm.      Heart sounds: Normal heart sounds.   Pulmonary:      Effort: Pulmonary effort is normal. No respiratory distress.      Breath sounds: Normal breath sounds. No wheezing or rhonchi.   Abdominal:      General: There is no distension.      Tenderness: There is no abdominal tenderness.   Musculoskeletal:         General: Swelling, deformity and signs of injury present.      " Cervical back: Normal range of motion.   Skin:     General: Skin is warm and dry.      Findings: Erythema present.   Neurological:      Mental Status: He is oriented to person, place, and time and easily aroused.                Significant Labs: All pertinent labs within the past 24 hours have been reviewed.  Recent Lab Results         06/03/24  1612   06/03/24  1318        Benzodiazepines Presumptive Positive         Methadone metabolites Negative         Phencyclidine Negative         Albumin   3.4       ALP   78       ALT   10       Amphetamines, Urine Negative         Anion Gap   9       Appearance, UA Clear         AST   12       Barbituates, Urine Negative         Baso #   0.02       Basophil %   0.2       Bilirubin (UA) Negative         BILIRUBIN TOTAL   0.5  Comment: For infants and newborns, interpretation of results should be based  on gestational age, weight and in agreement with clinical  observations.    Premature Infant recommended reference ranges:  Up to 24 hours.............<8.0 mg/dL  Up to 48 hours............<12.0 mg/dL  3-5 days..................<15.0 mg/dL  6-29 days.................<15.0 mg/dL         BUN   10       Calcium   10.0       Chloride   102       CO2   29       Cocaine, Urine Presumptive Positive         Color, UA Yellow         Creatinine   1.0       Urine Creatinine 97.5         CRP   49.8       Differential Method   Automated       eGFR   >60       Eos #   0.1       Eos %   0.8       Glucose   136       Glucose, UA Negative         Gran # (ANC)   6.8       Gran %   76.8       Hematocrit   47.5       Hemoglobin   15.9       Immature Grans (Abs)   0.02  Comment: Mild elevation in immature granulocytes is non specific and   can be seen in a variety of conditions including stress response,   acute inflammation, trauma and pregnancy. Correlation with other   laboratory and clinical findings is essential.         Immature Granulocytes   0.2       Ketones, UA Negative         Lactic  Acid Level   1.3  Comment: Falsely low lactic acid results can be found in samples   containing >=13.0 mg/dL total bilirubin and/or >=3.5 mg/dL   direct bilirubin.         Leukocyte Esterase, UA Negative         Lymph #   1.2       Lymph %   13.7       MCH   29.4       MCHC   33.5       MCV   88       Mono #   0.7       Mono %   8.3       MPV   9.0       NITRITE UA Negative         nRBC   0       Blood, UA Negative         Opiates, Urine Negative         pH, UA 8.0         Platelet Count   287       Potassium   4.2       PROTEIN TOTAL   7.8       Protein, UA Negative  Comment: Recommend a 24 hour urine protein or a urine   protein/creatinine ratio if globulin induced proteinuria is  clinically suspected.           RBC   5.41       RDW   12.9       Sodium   140       Spec Grav UA 1.015         Specimen UA Urine, Clean Catch         Marijuana (THC) Metabolite Negative         Toxicology Information SEE COMMENT  Comment: This screen includes the following classes of drugs at the listed   cut-off:    Benzodiazepines 200 ng/ml  Methadone 300 ng/ml  Cocaine metabolite 300 ng/ml  Opiates 300 ng/ml  Barbiturates 200 ng/ml  Amphetamines 1000 ng/ml  Marijuana metabs (THC) 50 ng/ml  Phencyclidine (PCP) 25 ng/ml    This is a screening test. If results do not correlate with clinical   presentation, then a confirmatory send out test is advised.     This report is intended for use in clinical monitoring and management   of   patients. It is not intended for use in employment related drug   testing.           UROBILINOGEN UA Negative         WBC   8.79               Significant Imaging: I have reviewed all pertinent imaging results/findings within the past 24 hours.

## 2024-06-03 NOTE — ED PROVIDER NOTES
Encounter Date: 6/3/2024       History     Chief Complaint   Patient presents with    Leg Swelling     Patient presents to ED from home with c/o pain, swelling, and drainage to right lower leg x6 months. Patient states he has not been seen by a doctor since the pandemic. Yellow drainage and swelling noted to leg. Patient reports self medicating with valium and other drugs not prescribed by a doctor. Patient also reports he has had and ankle fusion with hardware to the LLE x20 years ago.      Patient is a 58 y.o. male who presents to the ED for evaluation of pain, swelling, purulent drainage from his right lower extremity x6.  States that it has gotten worse past week.  Says that he has not medical care since pandemic.  No history of diabetes.  Patient says that he has been self medicating with valium and occasional cocaine use for pain control. Patient says that he had a right ankle fusion with hardware also reports he has had and ankle fusion with hardware to the right lower extremity proximally 20 years ago.  Patient says it is difficult to ambulate.  Denies fever, chills, chest pain, shortness of breath, nausea/vomiting, or any other complaints at this time.       The history is provided by the patient.     Review of patient's allergies indicates:  No Known Allergies  Past Medical History:   Diagnosis Date    Arthritis     Hypercholesteremia      Past Surgical History:   Procedure Laterality Date    ANKLE FUSION      carpel tunnel      HERNIA REPAIR      INGUINAL HERNIA REPAIR      KNEE ARTHROSCOPY Right     ORTHOPEDIC SURGERY       Family History   Problem Relation Name Age of Onset    Diabetes Father       Social History     Tobacco Use    Smoking status: Former     Current packs/day: 0.00     Average packs/day: 0.5 packs/day for 20.0 years (10.0 ttl pk-yrs)     Types: Cigarettes     Start date: 6/16/1994     Quit date: 5/16/2014     Years since quitting: 10.0   Substance Use Topics    Alcohol use: No    Drug  "use: Yes     Types: "Crack" cocaine, Marijuana, Other-see comments     Review of Systems   Constitutional:  Negative for chills and fever.   Respiratory:  Negative for shortness of breath and wheezing.    Cardiovascular:  Negative for chest pain.   Gastrointestinal:  Negative for diarrhea, nausea and vomiting.   Musculoskeletal:  Positive for arthralgias (right lower extremity pain) and gait problem. Negative for back pain, neck pain and neck stiffness.   Skin:  Positive for color change (redness, swelling, purulent drainage - right lower extremity). Negative for rash.   Neurological:  Negative for weakness.   Hematological:  Does not bruise/bleed easily.   All other systems reviewed and are negative.      Physical Exam     Initial Vitals   BP Pulse Resp Temp SpO2   06/03/24 1147 06/03/24 1145 06/03/24 1145 06/03/24 1145 06/03/24 1145   133/71 105 18 98.6 °F (37 °C) 95 %      MAP       --                Physical Exam    Constitutional: He appears well-developed and well-nourished. He is Obese .   HENT:   Head: Normocephalic and atraumatic.   Cardiovascular:  Normal rate.             Neurological: He is alert.   Skin: There is erythema.        Redness to right lower extremity with purulent drainage over anterior distal right lower extremity.  See photo. 2+ pedal pulse.              ED Course   Procedures  Labs Reviewed   CBC W/ AUTO DIFFERENTIAL - Abnormal; Notable for the following components:       Result Value    MPV 9.0 (*)     Gran % 76.8 (*)     Lymph % 13.7 (*)     All other components within normal limits   C-REACTIVE PROTEIN - Abnormal; Notable for the following components:    CRP 49.8 (*)     All other components within normal limits   COMPREHENSIVE METABOLIC PANEL - Abnormal; Notable for the following components:    Glucose 136 (*)     Albumin 3.4 (*)     All other components within normal limits   DRUG SCREEN PANEL, URINE EMERGENCY - Abnormal; Notable for the following components:    Benzodiazepines " Presumptive Positive (*)     Cocaine (Metab.) Presumptive Positive (*)     All other components within normal limits    Narrative:     Specimen Source->Urine   LACTIC ACID, PLASMA   URINALYSIS, REFLEX TO URINE CULTURE    Narrative:     Specimen Source->Urine   POCT GLUCOSE, HAND-HELD DEVICE          Imaging Results              US Lower Extremity Veins Right (Final result)  Result time 06/03/24 13:12:53      Final result by Faisal Figueroa MD (06/03/24 13:12:53)                   Impression:      No evidence of deep venous thrombosis in the right lower extremity.      Electronically signed by: Faisal Figueroa MD  Date:    06/03/2024  Time:    13:12               Narrative:    EXAMINATION:  US LOWER EXTREMITY VEINS RIGHT    CLINICAL HISTORY:  Localized edema    TECHNIQUE:  Duplex and color flow Doppler evaluation and graded compression of the right lower extremity veins was performed.    COMPARISON:  None    FINDINGS:  Duplex and color flow Doppler evaluation does not reveal any evidence of acute venous thrombosis in the common femoral, superficial femoral, greater saphenous, popliteal, peroneal, anterior tibial and posterior tibial veins of the right lower extremity.  There is no reflux to suggest valvular incompetence.                                       X-Ray Tibia Fibula 2 View Right (Final result)  Result time 06/03/24 13:15:08      Final result by Gina Rodriguez MD (06/03/24 13:15:08)                   Narrative:    EXAMINATION:  XR FOOT COMPLETE 3 VIEW RIGHT; XR TIBIA FIBULA 2 VIEW RIGHT    CLINICAL HISTORY:  . Unspecified open wound, right lower leg, initial encounter    TECHNIQUE:  AP, lateral, and oblique views of the right foot were performed.    Right tibia and fibula AP and lateral view    COMPARISON:  None.    FINDINGS:  There is end-stage degenerative change at the medial knee compartment.    There is postoperative changes of tibial talar calcaneal fusion, the hardware is intact.    The  tibia and fibula demonstrate no acute fracture seen, no osseous lesion seen, no abnormal periosteal reaction.  No osseous erosions.    The osseous structures of the foot demonstrate normal alignment.  No fracture or osseous erosion of the osseous structures of the foot.    Tiny 5 x 1 mm high density foreign body within the subcutaneous soft tissues of the plantar aspect of the foot just plantar to the middle phalanx of the 3rd toe.    Also, 5 x 1 mm radiopaque tiny foreign body within the subcutaneous soft tissues of the lateral plantar aspect of the foot just plantar and lateral to the 5th metatarsal bone.    Large osteophyte protruding dorsally at the talar-tarsal joint and tarsal metatarsal joint.    Thickened and irregular skin as well as significant circumferential subcutaneous soft tissue edema of the distal right lower extremity.  No soft tissue air.    Impression    As above      Electronically signed by: Gina Rodriguez MD  Date:    06/03/2024  Time:    13:15                                     X-Ray Foot Complete Right (Final result)  Result time 06/03/24 13:15:08      Final result by Gina Rodriguez MD (06/03/24 13:15:08)                   Narrative:    EXAMINATION:  XR FOOT COMPLETE 3 VIEW RIGHT; XR TIBIA FIBULA 2 VIEW RIGHT    CLINICAL HISTORY:  . Unspecified open wound, right lower leg, initial encounter    TECHNIQUE:  AP, lateral, and oblique views of the right foot were performed.    Right tibia and fibula AP and lateral view    COMPARISON:  None.    FINDINGS:  There is end-stage degenerative change at the medial knee compartment.    There is postoperative changes of tibial talar calcaneal fusion, the hardware is intact.    The tibia and fibula demonstrate no acute fracture seen, no osseous lesion seen, no abnormal periosteal reaction.  No osseous erosions.    The osseous structures of the foot demonstrate normal alignment.  No fracture or osseous erosion of the osseous structures of the  foot.    Tiny 5 x 1 mm high density foreign body within the subcutaneous soft tissues of the plantar aspect of the foot just plantar to the middle phalanx of the 3rd toe.    Also, 5 x 1 mm radiopaque tiny foreign body within the subcutaneous soft tissues of the lateral plantar aspect of the foot just plantar and lateral to the 5th metatarsal bone.    Large osteophyte protruding dorsally at the talar-tarsal joint and tarsal metatarsal joint.    Thickened and irregular skin as well as significant circumferential subcutaneous soft tissue edema of the distal right lower extremity.  No soft tissue air.    Impression    As above      Electronically signed by: Gina Rodriguez MD  Date:    06/03/2024  Time:    13:15                                     Medications   sodium chloride 0.9% flush 5 mL (has no administration in time range)   melatonin tablet 6 mg (has no administration in time range)   ondansetron injection 4 mg (has no administration in time range)   senna-docusate 8.6-50 mg per tablet 1 tablet (has no administration in time range)   naloxone 0.4 mg/mL injection 0.02 mg (has no administration in time range)   glucose chewable tablet 16 g (has no administration in time range)   glucose chewable tablet 24 g (has no administration in time range)   glucagon (human recombinant) injection 1 mg (has no administration in time range)   acetaminophen tablet 650 mg (650 mg Oral Given 6/3/24 1617)   insulin aspart U-100 pen 0-5 Units (has no administration in time range)   dextrose 10% bolus 125 mL 125 mL (has no administration in time range)   dextrose 10% bolus 250 mL 250 mL (has no administration in time range)   sulfamethoxazole-trimethoprim 800-160mg per tablet 1 tablet (1 tablet Oral Given 6/3/24 1615)   vancomycin - pharmacy to dose (has no administration in time range)   vancomycin (VANCOCIN) 2,500 mg in dextrose 5 % (D5W) 500 mL IVPB (2,500 mg Intravenous New Bag 6/3/24 1821)   heparin (porcine) injection  7,500 Units (has no administration in time range)   vancomycin 2 g in dextrose 5 % 500 mL IVPB (2,000 mg Intravenous Trough Due As Scheduled Before Dose 6/5/24 0600)     Medical Decision Making  Patient is an afebrile 58 y.o. male who presents for evaluation right lower extremity redness, swelling X 6 months.  Area of purulent drainage over the distal anterior lower extremity.  VSS and do not suggest sepsis.  Denies chest pain, SOB, or any other complaints at this time. A&Ox4. The patient remained comfortable and stable during their visit in the ED. Details of ED course documented in ED workup.     Differential diagnosis includes, but is not limited to:  Venous stasis, nonhealing wound, cellulitis, osteomyelitis, DVT    After review of the patients physical exam, ED testing, and history/symptoms, the patient will be admitted.  No DVT on ultrasound.  X-ray with significant circumferential subcutaneous soft tissue edema, no indication osteomyelitis or acute bony abnormalities.  [facility] called and case was discussed. Dr. Calloway will accept the admission with recommendations for antibiotics and wound care.  Admit orders will be completed. The diagnosis, treatment and plan for admission were discussed with the patient and understanding was verbalized. All questions or concerns have been addressed.     Amount and/or Complexity of Data Reviewed  Labs: ordered. Decision-making details documented in ED Course.  Radiology: ordered and independent interpretation performed. Decision-making details documented in ED Course.               ED Course as of 06/03/24 1936 Mon Jun 03, 2024   1218 Attempted to examine patient. Not in room.  [OB]   1258 Patient examined and assessed.  Labs, wound culture ordered. [OB]   1316 Ultrasound lower extremity veins reviewed: No DVT. [OB]   1325 X-ray tib fib reviewed: Thickened and irregular skin as well as significant circumferential subcutaneous soft tissue edema of the  distal right lower extremity [OB]   1328 WBC: 8.79  No leukocytosis [OB]   1328 Hemoglobin: 15.9 [OB]   1328 Hematocrit: 47.5  H&H stable [OB]   1347 CRP(!): 49.8 [OB]   1347 Lactic Acid Level: 1.3 [OB]   1355 Spoke with [facility].  We will place patient in observation for wound management and antibiotics. [OB]      ED Course User Index  [OB] Tianna Barbosa PA-C                           Clinical Impression:  Final diagnoses:  [S81.801A] Wound of right leg  [R60.0] Edema of right lower extremity  [L03.90] Wound cellulitis          ED Disposition Condition    Observation Stable                Tianna Barbosa PA-C  06/03/24 1938

## 2024-06-03 NOTE — PLAN OF CARE
06/03/24 1804   Admission   Initial VN Admission Questions Complete   Communication Issues? None   Shift   Virtual Nurse - Patient Verbalized Approval Of Camera Use   Safety/Activity   Patient Rounds visualized patient;ID band on;clutter free environment maintained;call light in patient/parent reach;bed wheels locked;bed in low position

## 2024-06-04 LAB
ALBUMIN SERPL BCP-MCNC: 3 G/DL (ref 3.5–5.2)
ALP SERPL-CCNC: 67 U/L (ref 55–135)
ALT SERPL W/O P-5'-P-CCNC: 11 U/L (ref 10–44)
ANION GAP SERPL CALC-SCNC: 10 MMOL/L (ref 8–16)
AST SERPL-CCNC: 12 U/L (ref 10–40)
BASOPHILS # BLD AUTO: 0.02 K/UL (ref 0–0.2)
BASOPHILS NFR BLD: 0.3 % (ref 0–1.9)
BILIRUB SERPL-MCNC: 0.4 MG/DL (ref 0.1–1)
BUN SERPL-MCNC: 10 MG/DL (ref 6–20)
CALCIUM SERPL-MCNC: 9.3 MG/DL (ref 8.7–10.5)
CHLORIDE SERPL-SCNC: 108 MMOL/L (ref 95–110)
CO2 SERPL-SCNC: 21 MMOL/L (ref 23–29)
CREAT SERPL-MCNC: 0.8 MG/DL (ref 0.5–1.4)
DIFFERENTIAL METHOD BLD: NORMAL
EOSINOPHIL # BLD AUTO: 0.1 K/UL (ref 0–0.5)
EOSINOPHIL NFR BLD: 1.9 % (ref 0–8)
ERYTHROCYTE [DISTWIDTH] IN BLOOD BY AUTOMATED COUNT: 13.1 % (ref 11.5–14.5)
EST. GFR  (NO RACE VARIABLE): >60 ML/MIN/1.73 M^2
GLUCOSE SERPL-MCNC: 107 MG/DL (ref 70–110)
HCT VFR BLD AUTO: 44.8 % (ref 40–54)
HGB BLD-MCNC: 14.6 G/DL (ref 14–18)
IMM GRANULOCYTES # BLD AUTO: 0.01 K/UL (ref 0–0.04)
IMM GRANULOCYTES NFR BLD AUTO: 0.1 % (ref 0–0.5)
LYMPHOCYTES # BLD AUTO: 2 K/UL (ref 1–4.8)
LYMPHOCYTES NFR BLD: 26.4 % (ref 18–48)
MAGNESIUM SERPL-MCNC: 2 MG/DL (ref 1.6–2.6)
MCH RBC QN AUTO: 29.3 PG (ref 27–31)
MCHC RBC AUTO-ENTMCNC: 32.6 G/DL (ref 32–36)
MCV RBC AUTO: 90 FL (ref 82–98)
MONOCYTES # BLD AUTO: 0.7 K/UL (ref 0.3–1)
MONOCYTES NFR BLD: 9.5 % (ref 4–15)
NEUTROPHILS # BLD AUTO: 4.6 K/UL (ref 1.8–7.7)
NEUTROPHILS NFR BLD: 61.8 % (ref 38–73)
NRBC BLD-RTO: 0 /100 WBC
PHOSPHATE SERPL-MCNC: 2.9 MG/DL (ref 2.7–4.5)
PLATELET # BLD AUTO: 240 K/UL (ref 150–450)
PMV BLD AUTO: 9.4 FL (ref 9.2–12.9)
POCT GLUCOSE: 111 MG/DL (ref 70–110)
POCT GLUCOSE: 127 MG/DL (ref 70–110)
POCT GLUCOSE: 129 MG/DL (ref 70–110)
POCT GLUCOSE: 92 MG/DL (ref 70–110)
POTASSIUM SERPL-SCNC: 4.2 MMOL/L (ref 3.5–5.1)
PROT SERPL-MCNC: 6.9 G/DL (ref 6–8.4)
RBC # BLD AUTO: 4.98 M/UL (ref 4.6–6.2)
SODIUM SERPL-SCNC: 139 MMOL/L (ref 136–145)
WBC # BLD AUTO: 7.49 K/UL (ref 3.9–12.7)

## 2024-06-04 PROCEDURE — 84100 ASSAY OF PHOSPHORUS: CPT | Performed by: STUDENT IN AN ORGANIZED HEALTH CARE EDUCATION/TRAINING PROGRAM

## 2024-06-04 PROCEDURE — 63600175 PHARM REV CODE 636 W HCPCS: Performed by: STUDENT IN AN ORGANIZED HEALTH CARE EDUCATION/TRAINING PROGRAM

## 2024-06-04 PROCEDURE — 85025 COMPLETE CBC W/AUTO DIFF WBC: CPT | Performed by: STUDENT IN AN ORGANIZED HEALTH CARE EDUCATION/TRAINING PROGRAM

## 2024-06-04 PROCEDURE — 25000003 PHARM REV CODE 250: Performed by: STUDENT IN AN ORGANIZED HEALTH CARE EDUCATION/TRAINING PROGRAM

## 2024-06-04 PROCEDURE — 96366 THER/PROPH/DIAG IV INF ADDON: CPT

## 2024-06-04 PROCEDURE — 96372 THER/PROPH/DIAG INJ SC/IM: CPT | Performed by: STUDENT IN AN ORGANIZED HEALTH CARE EDUCATION/TRAINING PROGRAM

## 2024-06-04 PROCEDURE — 99223 1ST HOSP IP/OBS HIGH 75: CPT | Mod: ,,, | Performed by: STUDENT IN AN ORGANIZED HEALTH CARE EDUCATION/TRAINING PROGRAM

## 2024-06-04 PROCEDURE — 80053 COMPREHEN METABOLIC PANEL: CPT | Performed by: STUDENT IN AN ORGANIZED HEALTH CARE EDUCATION/TRAINING PROGRAM

## 2024-06-04 PROCEDURE — 83735 ASSAY OF MAGNESIUM: CPT | Performed by: STUDENT IN AN ORGANIZED HEALTH CARE EDUCATION/TRAINING PROGRAM

## 2024-06-04 PROCEDURE — 36415 COLL VENOUS BLD VENIPUNCTURE: CPT | Performed by: STUDENT IN AN ORGANIZED HEALTH CARE EDUCATION/TRAINING PROGRAM

## 2024-06-04 PROCEDURE — 11000001 HC ACUTE MED/SURG PRIVATE ROOM

## 2024-06-04 RX ORDER — ENOXAPARIN SODIUM 100 MG/ML
40 INJECTION SUBCUTANEOUS EVERY 12 HOURS
Status: DISCONTINUED | OUTPATIENT
Start: 2024-06-04 | End: 2024-06-05 | Stop reason: HOSPADM

## 2024-06-04 RX ADMIN — VANCOMYCIN HYDROCHLORIDE 2000 MG: 500 INJECTION, POWDER, LYOPHILIZED, FOR SOLUTION INTRAVENOUS at 07:06

## 2024-06-04 RX ADMIN — ENOXAPARIN SODIUM 40 MG: 40 INJECTION SUBCUTANEOUS at 08:06

## 2024-06-04 RX ADMIN — HEPARIN SODIUM 7500 UNITS: 5000 INJECTION INTRAVENOUS; SUBCUTANEOUS at 06:06

## 2024-06-04 RX ADMIN — VANCOMYCIN HYDROCHLORIDE 2000 MG: 500 INJECTION, POWDER, LYOPHILIZED, FOR SOLUTION INTRAVENOUS at 06:06

## 2024-06-04 RX ADMIN — ENOXAPARIN SODIUM 40 MG: 40 INJECTION SUBCUTANEOUS at 11:06

## 2024-06-04 RX ADMIN — ACETAMINOPHEN 650 MG: 325 TABLET ORAL at 06:06

## 2024-06-04 RX ADMIN — Medication 6 MG: at 07:06

## 2024-06-04 RX ADMIN — THERA TABS 1 TABLET: TAB at 08:06

## 2024-06-04 RX ADMIN — ACETAMINOPHEN 650 MG: 325 TABLET ORAL at 07:06

## 2024-06-04 NOTE — PLAN OF CARE
CM met with pt - he is AAO x 3; confirmed demographics   Pt lives alone - sister Ami Centeno   is main contact -- lives in [place]    Pt drove himself to hospital - wants to drive home at discharge.       Independent prior to admit - pt drives, no home health.  Has grab bars at home.    dx:  R lower leg cellulitis     Wed CM/SW:    Pt ok to f/u with [facility] in [facility] asked to make f/u apt    for likely d/c to home tomorrow with po abx.    Will need wound care at d/c - referrals sent per [facility] for home health         06/04/24 8730   Discharge Planning   Assessment Type Discharge Planning Brief Assessment   Resource/Environmental Concerns none   Support Systems Family members  (sister Ami Centeno  [phone])   Equipment Currently Used at Home   (grab bars in bathroom)   Current Living Arrangements home   Patient/Family Anticipates Transition to home;home with family   Patient/Family Anticipated Services at Transition none   DME Needed Upon Discharge  none   Discharge Plan A Home   Discharge Plan B Home Health;Home

## 2024-06-04 NOTE — ASSESSMENT & PLAN NOTE
"Pt appears to have chronic, lipodermatosclerotic changes of bilateral lower extremities, greater on the R leg. R leg wound also has weeping and purulent drainage on the anterior aspect. Likely PAD/PVD/Venous status ulcers contributing to initial skin changes. Likely complicated recently by infection.    History of surgery and surgical hardware placement.    Xray knee/foot 6/3: "There is end-stage degenerative change at the medial knee compartment. There is postoperative changes of tibial talar calcaneal fusion, the hardware is intact. The tibia and fibula demonstrate no acute fracture seen, no osseous lesion seen, no abnormal periosteal reaction.  No osseous erosions. The osseous structures of the foot demonstrate normal alignment.  No fracture or osseous erosion of the osseous structures of the foot. Tiny 5 x 1 mm high density foreign body within the subcutaneous soft tissues of the plantar aspect of the foot just plantar to the middle phalanx of the 3rd toe. Also, 5 x 1 mm radiopaque tiny foreign body within the subcutaneous soft tissues of the lateral plantar aspect of the foot just plantar and lateral to the 5th metatarsal bone. Large osteophyte protruding dorsally at the talar-tarsal joint and tarsal metatarsal joint. Thickened and irregular skin as well as significant circumferential subcutaneous soft tissue edema of the distal right lower extremity.  No soft tissue air.    - Podiatry consulted. Appreciate recs  - Needs wrapping/wound care  - Antibiotics started. Vanc for empiric MRSA coverage. Included Bactrim. Deescalate to PO as appropriate prior to discharge  - Blood and wound cultures taken, cultures pending.  - DVT US negative    "

## 2024-06-04 NOTE — CONSULTS
[facility]  Podiatry  Consult Note    Patient Name: Maximino Marinelli  MRN: [id number]  Admission Date: 6/3/2024  Hospital Length of Stay: 0 days  Attending Physician: Juan Francisco Dotson,*  Primary Care Provider: Minor Cota MD     Inpatient consult to Podiatry  Consult performed by: Bertram Steinberg DPM  Consult ordered by: Lavon Payne MD  Reason for consult: see below        Subjective:     History of Present Illness:  58M with a PMHx HTN, HLD, s/p TTC fusion 20 years ago, cocaine use admitted for RLE cellulitis.     Podiatry was consulted for RLE wounds and cellulitis. Patient is a poor historian. He reports R leg swelling and redness for over 3 months. He has not seek medical help for his RLE swelling and redness. Bandar nay recent illness. He reports slight pain to his R leg. Denies seeing wound care to for his RLE. Denies f/c/n/v. Denies SOB or chest pain. He denies noticing any purulents drainage to his RLE wounds. Report being self ambulatory at home. Denies any other pedal complaints.     Hypertensive, Afebrile, WBC WNL. XR of R foot shows no OM like changes. US venous studies show no DVT to RLE    Scheduled Meds:   enoxparin  40 mg Subcutaneous Q12H    multivitamin  1 tablet Oral Daily    vancomycin (VANCOCIN) IV (PEDS and ADULTS)  2,000 mg Intravenous Q12H     Continuous Infusions:  PRN Meds:  Current Facility-Administered Medications:     acetaminophen, 650 mg, Oral, Q4H PRN    dextrose 10%, 12.5 g, Intravenous, PRN    dextrose 10%, 25 g, Intravenous, PRN    glucagon (human recombinant), 1 mg, Intramuscular, PRN    glucose, 16 g, Oral, PRN    glucose, 24 g, Oral, PRN    ibuprofen, 600 mg, Oral, Q6H PRN    insulin aspart U-100, 0-5 Units, Subcutaneous, QID (AC + HS) PRN    melatonin, 6 mg, Oral, Nightly PRN    naloxone, 0.02 mg, Intravenous, PRN    ondansetron, 4 mg, Intravenous, Q8H PRN    senna-docusate 8.6-50 mg, 1 tablet, Oral, BID PRN    sodium chloride 0.9%, 5 mL, Intravenous, PRN     "Pharmacy to dose Vancomycin consult, , , Once **AND** vancomycin - pharmacy to dose, , Intravenous, pharmacy to manage frequency    Review of patient's allergies indicates:  No Known Allergies     Past Medical History:   Diagnosis Date    Arthritis     Hypercholesteremia      Past Surgical History:   Procedure Laterality Date    ANKLE FUSION      carpel tunnel      HERNIA REPAIR      INGUINAL HERNIA REPAIR      KNEE ARTHROSCOPY Right     ORTHOPEDIC SURGERY       Family History       Problem Relation (Age of Onset)    Diabetes Father          Tobacco Use    Smoking status: Former     Current packs/day: 0.00     Average packs/day: 0.5 packs/day for 20.0 years (10.0 ttl pk-yrs)     Types: Cigarettes     Start date: 6/16/1994     Quit date: 5/16/2014     Years since quitting: 10.0    Smokeless tobacco: Not on file   Substance and Sexual Activity    Alcohol use: No    Drug use: Yes     Types: "Crack" cocaine, Marijuana, Other-see comments    Sexual activity: Not Currently     Partners: Female     Review of Systems   Constitutional:  Negative for fatigue and fever.   HENT: Negative.     Eyes: Negative.    Respiratory: Negative.     Cardiovascular: Negative.    Gastrointestinal: Negative.    Endocrine: Negative.    Genitourinary: Negative.    Musculoskeletal:  Positive for gait problem.   Skin:  Positive for color change and wound.        Reports redness and swelling to RLE   Allergic/Immunologic: Negative.    Hematological: Negative.    Psychiatric/Behavioral: Negative.       Objective:     Vital Signs (Most Recent):  Temp: 97.9 °F (36.6 °C) (06/04/24 1105)  Pulse: 84 (06/04/24 1105)  Resp: 20 (06/04/24 1105)  BP: (!) 114/56 (06/04/24 1105)  SpO2: (!) 92 % (06/04/24 1105) Vital Signs (24h Range):  Temp:  [97.9 °F (36.6 °C)-98.4 °F (36.9 °C)] 97.9 °F (36.6 °C)  Pulse:  [77-99] 84  Resp:  [20] 20  SpO2:  [92 %-100 %] 92 %  BP: (105-140)/(56-87) 114/56     Weight: (!) 148.3 kg (326 lb 15.1 oz)  Body mass index is 49.71 " kg/m².    Foot Exam    Right Foot/Ankle   Inspection and Palpation  Ecchymosis: none  Tenderness: (Tenderness to R leg and foot)  Swelling: dorsum (swelling of R leg and foot)  Skin Exam: maceration, cellulitis and abnormal color;     Neurovascular  Dorsalis pedis: doppler  Posterior tibial: doppler  Saphenous nerve sensation: diminished  Tibial nerve sensation: diminished  Superficial peroneal nerve sensation: diminished  Deep peroneal nerve sensation: diminished  Sural nerve sensation: diminished    Comments  R foot/leg: Erythema and edema noted to entire foot and leg up to the level of the knee. Mild maceration to anterior ankle/distal leg. Global xerosis of leg and foot. No open wounds noted. No purulent drainage, malodor, fluctuance, or crepitus noted.     Laboratory:  BMP:   Recent Labs   Lab 06/04/24  0530         K 4.2      CO2 21*   BUN 10   CREATININE 0.8   CALCIUM 9.3   MG 2.0     CBC:   Recent Labs   Lab 06/04/24  0530   WBC 7.49   RBC 4.98   HGB 14.6   HCT 44.8      MCV 90   MCH 29.3   MCHC 32.6     Diagnostic Results:  I have reviewed all pertinent imaging results/findings within the past 24 hours.    Clinical Findings:            Assessment/Plan:     ID  * Cellulitis of right lower extremity  Assesment:  Podiatry consulted for RLE wounds and cellulitis. No open wound noted. Severe erythema and edema noted. XR of R foot shows no OM like changes. US venous studies show no DVT. IDSA mild foot infection.     Plan  - Physical exam and imaging findings discussed with the patient.   - Discussed with the patient that he has no open wounds and no concerns of OM.   - Recommend continuing Abx for STI. Abx per primary   - Recommend continuing with wound care.  - Area of maceration to R anterior ankle rinsed and scrubbed with Vashe. R foot dressed with hydrofera blue, kirlex, and ACE wrap  - WBAT  - Podiatry will continue to follow    Thank you for your consult. I will follow-up with  patient. Please contact us if you have any additional questions.    Bertram Steinberg DPM  Podiatry  [facility]

## 2024-06-04 NOTE — PROGRESS NOTES
[facility] Medicine  Progress Note    Patient Name: Maximino Marinelli  MRN: [id number]  Patient Class: OP- Observation   Admission Date: 6/3/2024  Length of Stay: 0 days  Attending Physician: Juan Francisco Dotson,*  Primary Care Provider: Minor Cota MD        Subjective:     Principal Problem:Cellulitis of right lower extremity        HPI:  Patient, Maximino Marinelli, is a 58 y.o. male who presents to the ED for evaluation of pain, swelling, weeping and purulent drainage from his right lower extremity that started 6 months ago. States that it is persistent, but has gotten worse past week. Says that he has not medical care since before the pandemic. Claims history of HTN and HLD, but denies other medical conditions. No history of diabetes. Patient says that he has been self medicating with valium and occasional cocaine use for pain control. Patient says that he had a right ankle fusion with hardware also reports he has had and ankle fusion with hardware to the right lower extremity proximally 20 years ago. Patient says it is difficult to ambulate. Denies fever, chills, chest pain, shortness of breath, nausea/vomiting, or any other complaints at this time. Symptoms have been largely contained to lower extremities.    In ED, Vitals stable- /71, , Temp 98.6F, SpO2 95% on RA. Labs showed Normal CBC and CMP. Glucose 136. CRP elevated 49.8. UA normal. UDS presumptive positive for cocaine and benzodiazepines. Xrays of lower extremity negative for osteomyelitis, but showed foreign bodies. Pt admitted to [facility]  inpatient medicine service for antibiotics and specialist consultation by podiatry for wound care and possible intervention.    Overview/Hospital Course:  No notes on file    Interval History: Patient doing well overnight- no adverse events. Not yet seen by podiatry for wound care recs/rule out concern for osteo. Tolerating diet.     Review of Systems   Constitutional:  Positive for activity  change. Negative for appetite change, chills, fatigue, fever and unexpected weight change.   HENT: Negative.     Respiratory: Negative.     Cardiovascular: Negative.    Gastrointestinal: Negative.    Genitourinary: Negative.    Musculoskeletal: Negative.    Skin:  Positive for color change, rash and wound.   Neurological: Negative.      Objective:     Vital Signs (Most Recent):  Temp: 97.9 °F (36.6 °C) (06/04/24 1105)  Pulse: 84 (06/04/24 1105)  Resp: 20 (06/04/24 1105)  BP: (!) 114/56 (06/04/24 1105)  SpO2: (!) 92 % (06/04/24 1105) Vital Signs (24h Range):  Temp:  [97.9 °F (36.6 °C)-98.4 °F (36.9 °C)] 97.9 °F (36.6 °C)  Pulse:  [77-99] 84  Resp:  [20] 20  SpO2:  [92 %-100 %] 92 %  BP: (105-140)/(56-87) 114/56     Weight: (!) 148.3 kg (326 lb 15.1 oz)  Body mass index is 49.71 kg/m².    Intake/Output Summary (Last 24 hours) at 6/4/2024 1157  Last data filed at 6/4/2024 1152  Gross per 24 hour   Intake 480 ml   Output 1750 ml   Net -1270 ml         Physical Exam  Vitals and nursing note reviewed.   Constitutional:       General: He is not in acute distress.     Appearance: He is obese. He is ill-appearing.      Interventions: He is sedated.      Comments: Patient appeared to be under the influence of a substance. States he takes valium and cocaine at home.   HENT:      Head: Normocephalic and atraumatic.   Cardiovascular:      Rate and Rhythm: Normal rate and regular rhythm.      Heart sounds: Normal heart sounds.   Pulmonary:      Effort: Pulmonary effort is normal. No respiratory distress.      Breath sounds: Normal breath sounds. No wheezing or rhonchi.   Abdominal:      General: There is no distension.      Tenderness: There is no abdominal tenderness.   Musculoskeletal:         General: Swelling, deformity and signs of injury present.      Cervical back: Normal range of motion.   Skin:     General: Skin is warm and dry.      Findings: Erythema present.   Neurological:      Mental Status: He is oriented to  person, place, and time and easily aroused.             Significant Labs: All pertinent labs within the past 24 hours have been reviewed.  Recent Lab Results  (Last 5 results in the past 24 hours)        06/04/24  1107   06/04/24  0556   06/04/24  0530   06/03/24 2033 06/03/24  1612        Benzodiazepines         Presumptive Positive       Methadone metabolites         Negative       Phencyclidine         Negative       Albumin     3.0           ALP     67           ALT     11           Amphetamines, Urine         Negative       Anion Gap     10           Appearance, UA         Clear       AST     12           Barbituates, Urine         Negative       Baso #     0.02           Basophil %     0.3           Bilirubin (UA)         Negative       BILIRUBIN TOTAL     0.4  Comment: For infants and newborns, interpretation of results should be based  on gestational age, weight and in agreement with clinical  observations.    Premature Infant recommended reference ranges:  Up to 24 hours.............<8.0 mg/dL  Up to 48 hours............<12.0 mg/dL  3-5 days..................<15.0 mg/dL  6-29 days.................<15.0 mg/dL             Blood Culture, Routine               BUN     10           Calcium     9.3           Chloride     108           CO2     21           Cocaine, Urine         Presumptive Positive       Color, UA         Yellow       Creatinine     0.8           Urine Creatinine         97.5       Differential Method     Automated           eGFR     >60           Eos #     0.1           Eos %     1.9           Glucose     107           Glucose, UA         Negative       Gran # (ANC)     4.6           Gran %     61.8           Hematocrit     44.8           Hemoglobin     14.6           Immature Grans (Abs)     0.01  Comment: Mild elevation in immature granulocytes is non specific and   can be seen in a variety of conditions including stress response,   acute inflammation, trauma and pregnancy. Correlation with  other   laboratory and clinical findings is essential.             Immature Granulocytes     0.1           Ketones, UA         Negative       Leukocyte Esterase, UA         Negative       Lymph #     2.0           Lymph %     26.4           Magnesium      2.0           MCH     29.3           MCHC     32.6           MCV     90           Mono #     0.7           Mono %     9.5           MPV     9.4           NITRITE UA         Negative       nRBC     0           Blood, UA         Negative       Opiates, Urine         Negative       pH, UA         8.0       Phosphorus Level     2.9           Platelet Count     240           POCT Glucose 129   111     122         Potassium     4.2           PROTEIN TOTAL     6.9           Protein, UA         Negative  Comment: Recommend a 24 hour urine protein or a urine   protein/creatinine ratio if globulin induced proteinuria is  clinically suspected.         RBC     4.98           RDW     13.1           Sodium     139           Spec Grav UA         1.015       Specimen UA         Urine, Clean Catch       Marijuana (THC) Metabolite         Negative       Toxicology Information         SEE COMMENT  Comment: This screen includes the following classes of drugs at the listed   cut-off:    Benzodiazepines 200 ng/ml  Methadone 300 ng/ml  Cocaine metabolite 300 ng/ml  Opiates 300 ng/ml  Barbiturates 200 ng/ml  Amphetamines 1000 ng/ml  Marijuana metabs (THC) 50 ng/ml  Phencyclidine (PCP) 25 ng/ml    This is a screening test. If results do not correlate with clinical   presentation, then a confirmatory send out test is advised.     This report is intended for use in clinical monitoring and management   of   patients. It is not intended for use in employment related drug   testing.         UROBILINOGEN UA         Negative       WBC     7.49                                  Significant Imaging: I have reviewed all pertinent imaging results/findings within the past 24 hours.    Assessment/Plan:  "     * Cellulitis of right lower extremity  Pt appears to have chronic, lipodermatosclerotic changes of bilateral lower extremities, greater on the R leg. R leg wound also has weeping and purulent drainage on the anterior aspect. Likely PAD/PVD/Venous status ulcers contributing to initial skin changes. Likely complicated recently by infection.    History of surgery and surgical hardware placement.    Xray knee/foot 6/3: "There is end-stage degenerative change at the medial knee compartment. There is postoperative changes of tibial talar calcaneal fusion, the hardware is intact. The tibia and fibula demonstrate no acute fracture seen, no osseous lesion seen, no abnormal periosteal reaction.  No osseous erosions. The osseous structures of the foot demonstrate normal alignment.  No fracture or osseous erosion of the osseous structures of the foot. Tiny 5 x 1 mm high density foreign body within the subcutaneous soft tissues of the plantar aspect of the foot just plantar to the middle phalanx of the 3rd toe. Also, 5 x 1 mm radiopaque tiny foreign body within the subcutaneous soft tissues of the lateral plantar aspect of the foot just plantar and lateral to the 5th metatarsal bone. Large osteophyte protruding dorsally at the talar-tarsal joint and tarsal metatarsal joint. Thickened and irregular skin as well as significant circumferential subcutaneous soft tissue edema of the distal right lower extremity.  No soft tissue air.    - Podiatry consulted. Appreciate recs. With history of hardware/foreign bodies, low concern for osteo/infection nidus at this time, but consider imaging or further evaluation as needed.  - Needs wrapping/wound care  - Antibiotics started. Vanc for empiric MRSA coverage. Included Bactrim. Deescalate to PO as appropriate prior to discharge  - Blood and wound cultures taken, cultures pending.  - DVT US negative. Lovenox for VTE prophylaxis.      Lipodermatosclerosis of both lower extremities  See " Cellulitis of RLE      Substance abuse  Positive UDS for benzos and cocaine. Pt stated he self-medicates with Valium and Cocaine.     Monitor for toxidrome symptoms and withdrawal symptoms    CIWA protocols  Seizure precautions.        VTE Risk Mitigation (From admission, onward)           Ordered     enoxaparin injection 40 mg  Every 12 hours         06/04/24 1002     IP VTE HIGH RISK PATIENT  Once         06/04/24 1002     Place sequential compression device  Until discontinued         06/03/24 1502                    Discharge Planning   KOBY:      Code Status: Full Code   Is the patient medically ready for discharge?:     Reason for patient still in hospital (select all that apply): Patient trending condition, Treatment, and Consult recommendations                     Lavon Payne MD  Department of Hospital Medicine   [facility]

## 2024-06-04 NOTE — HPI
Patient, Maximino Marinelli, is a 58 y.o. male who presents to the ED for evaluation of pain, swelling, weeping and purulent drainage from his right lower extremity that started 6 months ago. States that it is persistent, but has gotten worse past week. Says that he has not medical care since before the pandemic. Claims history of HTN and HLD, but denies other medical conditions. No history of diabetes. Patient says that he has been self medicating with valium and occasional cocaine use for pain control. Patient says that he had a right ankle fusion with hardware also reports he has had and ankle fusion with hardware to the right lower extremity proximally 20 years ago. Patient says it is difficult to ambulate. Denies fever, chills, chest pain, shortness of breath, nausea/vomiting, or any other complaints at this time. Symptoms have been largely contained to lower extremities.    In ED, Vitals stable- /71, , Temp 98.6F, SpO2 95% on RA. Labs showed Normal CBC and CMP. Glucose 136. CRP elevated 49.8. UA normal. UDS presumptive positive for cocaine and benzodiazepines. Xrays of lower extremity negative for osteomyelitis, but showed foreign bodies. Pt admitted to [facility]  inpatient medicine service for antibiotics and specialist consultation by podiatry for wound care and possible intervention.

## 2024-06-04 NOTE — SUBJECTIVE & OBJECTIVE
"Scheduled Meds:   enoxparin  40 mg Subcutaneous Q12H    multivitamin  1 tablet Oral Daily    vancomycin (VANCOCIN) IV (PEDS and ADULTS)  2,000 mg Intravenous Q12H     Continuous Infusions:  PRN Meds:  Current Facility-Administered Medications:     acetaminophen, 650 mg, Oral, Q4H PRN    dextrose 10%, 12.5 g, Intravenous, PRN    dextrose 10%, 25 g, Intravenous, PRN    glucagon (human recombinant), 1 mg, Intramuscular, PRN    glucose, 16 g, Oral, PRN    glucose, 24 g, Oral, PRN    ibuprofen, 600 mg, Oral, Q6H PRN    insulin aspart U-100, 0-5 Units, Subcutaneous, QID (AC + HS) PRN    melatonin, 6 mg, Oral, Nightly PRN    naloxone, 0.02 mg, Intravenous, PRN    ondansetron, 4 mg, Intravenous, Q8H PRN    senna-docusate 8.6-50 mg, 1 tablet, Oral, BID PRN    sodium chloride 0.9%, 5 mL, Intravenous, PRN    Pharmacy to dose Vancomycin consult, , , Once **AND** vancomycin - pharmacy to dose, , Intravenous, pharmacy to manage frequency    Review of patient's allergies indicates:  No Known Allergies     Past Medical History:   Diagnosis Date    Arthritis     Hypercholesteremia      Past Surgical History:   Procedure Laterality Date    ANKLE FUSION      carpel tunnel      HERNIA REPAIR      INGUINAL HERNIA REPAIR      KNEE ARTHROSCOPY Right     ORTHOPEDIC SURGERY         Family History       Problem Relation (Age of Onset)    Diabetes Father          Tobacco Use    Smoking status: Former     Current packs/day: 0.00     Average packs/day: 0.5 packs/day for 20.0 years (10.0 ttl pk-yrs)     Types: Cigarettes     Start date: 6/16/1994     Quit date: 5/16/2014     Years since quitting: 10.0    Smokeless tobacco: Not on file   Substance and Sexual Activity    Alcohol use: No    Drug use: Yes     Types: "Crack" cocaine, Marijuana, Other-see comments    Sexual activity: Not Currently     Partners: Female     Review of Systems   Constitutional:  Negative for fatigue and fever.   HENT: Negative.     Eyes: Negative.    Respiratory: " Negative.     Cardiovascular: Negative.    Gastrointestinal: Negative.    Endocrine: Negative.    Genitourinary: Negative.    Musculoskeletal:  Positive for gait problem.   Skin:  Positive for color change and wound.        Reports redness and swelling to RLE   Allergic/Immunologic: Negative.    Hematological: Negative.    Psychiatric/Behavioral: Negative.       Objective:     Vital Signs (Most Recent):  Temp: 97.9 °F (36.6 °C) (06/04/24 1105)  Pulse: 84 (06/04/24 1105)  Resp: 20 (06/04/24 1105)  BP: (!) 114/56 (06/04/24 1105)  SpO2: (!) 92 % (06/04/24 1105) Vital Signs (24h Range):  Temp:  [97.9 °F (36.6 °C)-98.4 °F (36.9 °C)] 97.9 °F (36.6 °C)  Pulse:  [77-99] 84  Resp:  [20] 20  SpO2:  [92 %-100 %] 92 %  BP: (105-140)/(56-87) 114/56     Weight: (!) 148.3 kg (326 lb 15.1 oz)  Body mass index is 49.71 kg/m².    Foot Exam    Right Foot/Ankle     Inspection and Palpation  Ecchymosis: none  Tenderness: (Tenderness to R leg and foot)  Swelling: dorsum (swelling of R leg and foot)  Skin Exam: maceration, cellulitis and abnormal color;     Neurovascular  Dorsalis pedis: doppler  Posterior tibial: doppler  Saphenous nerve sensation: diminished  Tibial nerve sensation: diminished  Superficial peroneal nerve sensation: diminished  Deep peroneal nerve sensation: diminished  Sural nerve sensation: diminished    Comments  R foot/leg: Erythema and edema noted to entire foot and leg up to the level of the knee. Mild maceration to anterior ankle/distal leg. Global xerosis of leg and foot. No open wounds noted. No purulent drainage, malodor, fluctuance, or crepitus noted.           Laboratory:  BMP:   Recent Labs   Lab 06/04/24  0530         K 4.2      CO2 21*   BUN 10   CREATININE 0.8   CALCIUM 9.3   MG 2.0     CBC:   Recent Labs   Lab 06/04/24  0530   WBC 7.49   RBC 4.98   HGB 14.6   HCT 44.8      MCV 90   MCH 29.3   MCHC 32.6     Diagnostic Results:  I have reviewed all pertinent imaging  results/findings within the past 24 hours.    Clinical Findings:

## 2024-06-04 NOTE — ASSESSMENT & PLAN NOTE
"Pt appears to have chronic, lipodermatosclerotic changes of bilateral lower extremities, greater on the R leg. R leg wound also has weeping and purulent drainage on the anterior aspect. Likely PAD/PVD/Venous status ulcers contributing to initial skin changes. Likely complicated recently by infection.    History of surgery and surgical hardware placement.    Xray knee/foot 6/3: "There is end-stage degenerative change at the medial knee compartment. There is postoperative changes of tibial talar calcaneal fusion, the hardware is intact. The tibia and fibula demonstrate no acute fracture seen, no osseous lesion seen, no abnormal periosteal reaction.  No osseous erosions. The osseous structures of the foot demonstrate normal alignment.  No fracture or osseous erosion of the osseous structures of the foot. Tiny 5 x 1 mm high density foreign body within the subcutaneous soft tissues of the plantar aspect of the foot just plantar to the middle phalanx of the 3rd toe. Also, 5 x 1 mm radiopaque tiny foreign body within the subcutaneous soft tissues of the lateral plantar aspect of the foot just plantar and lateral to the 5th metatarsal bone. Large osteophyte protruding dorsally at the talar-tarsal joint and tarsal metatarsal joint. Thickened and irregular skin as well as significant circumferential subcutaneous soft tissue edema of the distal right lower extremity.  No soft tissue air.    - Podiatry consulted. Appreciate recs. With history of hardware/foreign bodies, low concern for osteo/infection nidus at this time, but consider imaging or further evaluation as needed.  - Needs wrapping/wound care  - Antibiotics started. Vanc for empiric MRSA coverage. Included Bactrim. Deescalate to PO as appropriate prior to discharge  - Blood and wound cultures taken, cultures pending.  - DVT US negative. Lovenox for VTE prophylaxis.    "

## 2024-06-04 NOTE — SUBJECTIVE & OBJECTIVE
Interval History: Patient doing well overnight- no adverse events. Not yet seen by podiatry for wound care recs/rule out concern for osteo. Tolerating diet.     Review of Systems   Constitutional:  Positive for activity change. Negative for appetite change, chills, fatigue, fever and unexpected weight change.   HENT: Negative.     Respiratory: Negative.     Cardiovascular: Negative.    Gastrointestinal: Negative.    Genitourinary: Negative.    Musculoskeletal: Negative.    Skin:  Positive for color change, rash and wound.   Neurological: Negative.      Objective:     Vital Signs (Most Recent):  Temp: 97.9 °F (36.6 °C) (06/04/24 1105)  Pulse: 84 (06/04/24 1105)  Resp: 20 (06/04/24 1105)  BP: (!) 114/56 (06/04/24 1105)  SpO2: (!) 92 % (06/04/24 1105) Vital Signs (24h Range):  Temp:  [97.9 °F (36.6 °C)-98.4 °F (36.9 °C)] 97.9 °F (36.6 °C)  Pulse:  [77-99] 84  Resp:  [20] 20  SpO2:  [92 %-100 %] 92 %  BP: (105-140)/(56-87) 114/56     Weight: (!) 148.3 kg (326 lb 15.1 oz)  Body mass index is 49.71 kg/m².    Intake/Output Summary (Last 24 hours) at 6/4/2024 1157  Last data filed at 6/4/2024 1152  Gross per 24 hour   Intake 480 ml   Output 1750 ml   Net -1270 ml         Physical Exam  Vitals and nursing note reviewed.   Constitutional:       General: He is not in acute distress.     Appearance: He is obese. He is ill-appearing.      Interventions: He is sedated.      Comments: Patient appeared to be under the influence of a substance. States he takes valium and cocaine at home.   HENT:      Head: Normocephalic and atraumatic.   Cardiovascular:      Rate and Rhythm: Normal rate and regular rhythm.      Heart sounds: Normal heart sounds.   Pulmonary:      Effort: Pulmonary effort is normal. No respiratory distress.      Breath sounds: Normal breath sounds. No wheezing or rhonchi.   Abdominal:      General: There is no distension.      Tenderness: There is no abdominal tenderness.   Musculoskeletal:         General: Swelling,  deformity and signs of injury present.      Cervical back: Normal range of motion.   Skin:     General: Skin is warm and dry.      Findings: Erythema present.   Neurological:      Mental Status: He is oriented to person, place, and time and easily aroused.             Significant Labs: All pertinent labs within the past 24 hours have been reviewed.  Recent Lab Results  (Last 5 results in the past 24 hours)        06/04/24  1107   06/04/24  0556   06/04/24  0530   06/03/24  2033   06/03/24  1612        Benzodiazepines         Presumptive Positive       Methadone metabolites         Negative       Phencyclidine         Negative       Albumin     3.0           ALP     67           ALT     11           Amphetamines, Urine         Negative       Anion Gap     10           Appearance, UA         Clear       AST     12           Barbituates, Urine         Negative       Baso #     0.02           Basophil %     0.3           Bilirubin (UA)         Negative       BILIRUBIN TOTAL     0.4  Comment: For infants and newborns, interpretation of results should be based  on gestational age, weight and in agreement with clinical  observations.    Premature Infant recommended reference ranges:  Up to 24 hours.............<8.0 mg/dL  Up to 48 hours............<12.0 mg/dL  3-5 days..................<15.0 mg/dL  6-29 days.................<15.0 mg/dL             Blood Culture, Routine               BUN     10           Calcium     9.3           Chloride     108           CO2     21           Cocaine, Urine         Presumptive Positive       Color, UA         Yellow       Creatinine     0.8           Urine Creatinine         97.5       Differential Method     Automated           eGFR     >60           Eos #     0.1           Eos %     1.9           Glucose     107           Glucose, UA         Negative       Gran # (ANC)     4.6           Gran %     61.8           Hematocrit     44.8           Hemoglobin     14.6           Immature Grans  (Abs)     0.01  Comment: Mild elevation in immature granulocytes is non specific and   can be seen in a variety of conditions including stress response,   acute inflammation, trauma and pregnancy. Correlation with other   laboratory and clinical findings is essential.             Immature Granulocytes     0.1           Ketones, UA         Negative       Leukocyte Esterase, UA         Negative       Lymph #     2.0           Lymph %     26.4           Magnesium      2.0           MCH     29.3           MCHC     32.6           MCV     90           Mono #     0.7           Mono %     9.5           MPV     9.4           NITRITE UA         Negative       nRBC     0           Blood, UA         Negative       Opiates, Urine         Negative       pH, UA         8.0       Phosphorus Level     2.9           Platelet Count     240           POCT Glucose 129   111     122         Potassium     4.2           PROTEIN TOTAL     6.9           Protein, UA         Negative  Comment: Recommend a 24 hour urine protein or a urine   protein/creatinine ratio if globulin induced proteinuria is  clinically suspected.         RBC     4.98           RDW     13.1           Sodium     139           Spec Grav UA         1.015       Specimen UA         Urine, Clean Catch       Marijuana (THC) Metabolite         Negative       Toxicology Information         SEE COMMENT  Comment: This screen includes the following classes of drugs at the listed   cut-off:    Benzodiazepines 200 ng/ml  Methadone 300 ng/ml  Cocaine metabolite 300 ng/ml  Opiates 300 ng/ml  Barbiturates 200 ng/ml  Amphetamines 1000 ng/ml  Marijuana metabs (THC) 50 ng/ml  Phencyclidine (PCP) 25 ng/ml    This is a screening test. If results do not correlate with clinical   presentation, then a confirmatory send out test is advised.     This report is intended for use in clinical monitoring and management   of   patients. It is not intended for use in employment related drug    testing.         UROBILINOGEN UA         Negative       WBC     7.49                                  Significant Imaging: I have reviewed all pertinent imaging results/findings within the past 24 hours.

## 2024-06-04 NOTE — ASSESSMENT & PLAN NOTE
Positive UDS for benzos and cocaine. Pt stated he self-medicates with Valium and Cocaine.     Monitor for toxidrome symptoms and withdrawal symptoms    CIWA protocols  Seizure precautions.

## 2024-06-04 NOTE — PLAN OF CARE
Problem: Adult Inpatient Plan of Care  Goal: Plan of Care Review  Outcome: Progressing  Goal: Patient-Specific Goal (Individualized)  Outcome: Progressing  Goal: Absence of Hospital-Acquired Illness or Injury  Outcome: Progressing  Goal: Optimal Comfort and Wellbeing  Outcome: Progressing  Goal: Readiness for Transition of Care  Outcome: Progressing     Problem: Bariatric Environmental Safety  Goal: Safety Maintained with Care  Outcome: Progressing     Problem: Skin Injury Risk Increased  Goal: Skin Health and Integrity  Outcome: Progressing     Problem: Skin or Soft Tissue Infection  Goal: Absence of Infection Signs and Symptoms  Outcome: Progressing     Problem: Excessive Substance Use  Goal: Optimized Energy Level (Excessive Substance Use)  Outcome: Progressing  Goal: Improved Behavioral Control (Excessive Substance Use)  Outcome: Progressing  Goal: Increased Participation and Engagement (Excessive Substance Use)  Outcome: Progressing  Goal: Improved Physiologic Symptoms (Excessive Substance Use)  Outcome: Progressing  Goal: Enhanced Social, Occupational or Functional Skills (Excessive Substance Use)  Outcome: Progressing

## 2024-06-04 NOTE — HPI
58M with a PMHx HTN, HLD, s/p TTC fusion 20 years ago, cocaine use admitted for RLE cellulitis.     Podiatry was consulted for RLE wounds and cellulitis. Patient is a poor historian. He reports R leg swelling and redness for over 3 months. He has not seek medical help for his RLE swelling and redness. Bandar nay recent illness. He reports slight pain to his R leg. Denies seeing wound care to for his RLE. Denies f/c/n/v. Denies SOB or chest pain. He denies noticing any purulents drainage to his RLE wounds. Report being self ambulatory at home. Denies any other pedal complaints.     Hypertensive, Afebrile, WBC WNL. XR of R foot shows no OM like changes. US venous studies show no DVT to RLE

## 2024-06-04 NOTE — ASSESSMENT & PLAN NOTE
Assesment:  Podiatry consulted for RLE wounds and cellulitis. No open wound noted. Severe erythema and edema noted. XR of R foot shows no OM like changes. US venous studies show no DVT. IDSA mild foot infection.     Plan  - Physical exam and imaging findings discussed with the patient.   - Discussed with the patient that he has no open wounds and no concerns of OM.   - Recommend continuing Abx for STI.   - Abx per primary   - Area of maceration to R anterior ankle rinsed and scrubbed with Vashe. R foot dressed with hydrofera blue, kirlex, and ACE wrap  - Podiatry will continue to follow

## 2024-06-04 NOTE — PLAN OF CARE
6/28/2022       RE: Harry C Cushing  [address]  [facility] [address]     Dear Colleague,    Thank you for referring your patient, Harry C Cushing, to the [facility]. Please see a copy of my visit note below.    I am suspicious that Ky's dry weight could be lower.  He has a very high CVP/RA the reasons for such are not readily apparent.  He also complains of increasing abdominal girth (abdominal echo ordered).  I discussed with him the options for bringing the weight down without excessive cramping - increase dialysis to 4 times per week or extending dialysis time on the 3X week schedule.    A more remote possibility would be that he has constrictive pericarditis from remote hear surgery.    Plan  1. Drive dry weight toward (arbitrarily) 214-215 or lower  2. Abdominal US  3. Non contrast chest CT for pericardial thickness  4. Repeat heart cath after weight decrease of if we can't take off weight (cath will have to be simultaneous right and left heart pressure measurements.      Current Outpatient Medications   Medication     ammonium lactate (AMLACTIN) 12 % external cream     amoxicillin (AMOXIL) 500 MG capsule     apixaban ANTICOAGULANT (ELIQUIS ANTICOAGULANT) 2.5 MG tablet     atorvastatin (LIPITOR) 40 MG tablet     B Complex-C-Folic Acid (TRISTEN-OWEN RX) 1 mg TABS     blood glucose (ACCU-CHEK GUIDE) test strip     budesonide (PULMICORT) 0.5 MG/2ML neb solution     carvedilol (COREG) 25 MG tablet     cetirizine (ZYRTEC) 10 MG tablet     COMPRESSION STOCKINGS     Epoetin Isaías (EPOGEN IJ)     febuxostat (ULORIC) 40 MG TABS tablet     hydrocortisone 2.5 % cream     insulin glargine (LANTUS SOLOSTAR) 100 UNIT/ML pen     insulin pen needle (BD ANGELA U/F) 32G X 4 MM miscellaneous     Iron Sucrose (VENOFER IV)     isosorbide dinitrate (ISORDIL) 20 MG tablet     losartan (COZAAR) 25 MG tablet     mupirocin (BACTROBAN) 2 %  VN note: progress notes, labs and vital signs reviewed. Will be available to intervene if needed.     Problem: Adult Inpatient Plan of Care  Goal: Plan of Care Review  Outcome: Progressing      external ointment     NOVOLOG FLEXPEN 100 UNIT/ML soln     ONETOUCH ULTRA test strip     order for DME     order for DME     ORDER FOR DME     polyethylene glycol (MIRALAX) 17 GM/Dose powder     torsemide (DEMADEX) 100 MG tablet     triamcinolone (KENALOG) 0.1 % external cream     UNABLE TO FIND     vitamin D3 (VITAMIN D3) 50 mcg (2000 units) tablet     No current facility-administered medications for this visit.       Name: CUSHING, HARRY C  MRN: [id number]  : 1961  Study Date: 2022 08:38 AM  Age: 61 yrs  Gender: Male  Patient Location: [facility]  Reason For Study: Chronic diastolic congestive heart failure (H), Pulmonary  hypert  Ordering Physician: RODO PORTILLO  Referring Physician: RODO PORTILLO  Performed By: Bill Castano RDCS     BSA: 2.2 m2  Height: 72 in  Weight: 220 lb  HR: 87  BP: 109/58 mmHg  ______________________________________________________________________________  Procedure  Echocardiogram with two-dimensional, color and spectral Doppler performed.  ______________________________________________________________________________  Interpretation Summary  Left ventricular wall thickness is normal. Left ventricular size is normal.  The visual ejection fraction is 50-55%. Flattened septum is consistent with  right ventricular pressure overload.  The right ventricle is normal size. Global right ventricular function is  mildly to moderately reduced.  A bioprosthetic aortic valve is present. Doppler interrogation of the aortic  valve is normal with a mean gradient of 5mmHg. There is trace valvular AI  Mild tricuspid insufficiency is present.  Right ventricular systolic pressure is 50mmHg above the right atrial pressure.  IVC diameter >2.1 cm collapsing <50% with sniff suggests a high RA pressure  estimated at 15 mmHg or greater. No pericardial effusion is present.     Overall no significant  change  ______________________________________________________________________________  Left Ventricle  Left ventricular wall thickness is normal. Left ventricular size is normal.  The visual ejection fraction is 50-55%. Left ventricular diastolic function is  indeterminate. Flattened septum is consistent with right ventricular pressure  overload.     Right Ventricle  The right ventricle is normal size. Global right ventricular function is  mildly to moderately reduced. A pacemaker lead is noted in the right  ventricle.     Atria  Moderate biatrial enlargement is present.     Mitral Valve  Mild to moderate mitral annular calcification is present. Trace mitral  insufficiency is present.     Aortic Valve  A bioprosthetic aortic valve is present. Doppler interrogation of the aortic  valve is normal with a mean gradient of 5mmHg. There is trace valvular AI.     Tricuspid Valve  The tricuspid valve is normal. Mild tricuspid insufficiency is present. Right  ventricular systolic pressure is 50mmHg above the right atrial pressure.     Pulmonic Valve  The pulmonic valve is normal.     Vessels  The aorta root is normal. IVC diameter >2.1 cm collapsing <50% with sniff  suggests a high RA pressure estimated at 15 mmHg or greater.     Pericardium  No pericardial effusion is present.     Compared to Previous Study  Overall no significant change.  ______________________________________________________________________________    Hemodynamics    RA --/--/24  RV 80/22  PA 80/34/50  PCWP --/--/20  Cardiac output by Umm: 6.23 L/min (indexed to 2.8L/min/m2)  Cardiac output by thermodilution: 5.6 L/min (indexed to 2.52 L/min/m2)  PVR by UMM CO: 4.8   (TD) Right sided filling pressures are severely elevated. Left sided filling pressures are mildly elevated. Severely elevated pulmonary artery hypertension. Normal cardiac output level. Hemodynamic data has been modified in Epic per physician review.         Wt Readings from Last 24  Encounters:   05/31/22 99.8 kg (220 lb)   04/05/22 100.7 kg (222 lb)   10/26/21 108.1 kg (238 lb 4.8 oz)   10/05/21 100.7 kg (222 lb)   09/21/21 103.7 kg (228 lb 11.2 oz)   08/03/21 100.9 kg (222 lb 8 oz)   05/14/21 100.4 kg (221 lb 6.4 oz)   04/27/21 100.7 kg (222 lb)   04/14/21 99.7 kg (219 lb 12.8 oz)   04/01/21 99.7 kg (219 lb 12.8 oz)   03/14/21 101.5 kg (223 lb 12.8 oz)   01/27/21 103.4 kg (228 lb)   01/20/21 103.5 kg (228 lb 1.6 oz)   01/06/21 105.2 kg (232 lb)   12/29/20 105.2 kg (232 lb)   12/23/20 104 kg (229 lb 4.8 oz)   12/11/20 105 kg (231 lb 8 oz)   11/18/20 101 kg (222 lb 9.6 oz)   11/12/20 102.1 kg (225 lb)   11/04/20 103.4 kg (227 lb 14.4 oz)   10/14/20 100.7 kg (222 lb)   09/29/20 99.8 kg (220 lb)   07/22/20 99.2 kg (218 lb 12.8 oz)   06/30/20 101.6 kg (224 lb)       sided filling pressures are severely elevated.    Severely elevated pulmonary artery hypertension.    Left sided filling pressures are mildly elevated.    Normal cardiac output level.    Hemodynamic data has been modified in Epic per physician review.     Mild non-obstructive CAD           Plan      Follow bedrest per protocol    Continued medical management and lifestyle modifications for cardiovascular risk factor optimizations.    Arterial sheath removed from radial artery with TR band placement.    Discharge today per protocol         Coronary Findings      Diagnostic  Dominance: Left    Left Main   The vessel is large and is angiographically normal.   Left Anterior Descending   The vessel is moderate in size.   Prox LAD to Mid LAD lesion is 40% stenosed.   First Diagonal Branch   The vessel is moderate in size. The vessel exhibits minimal luminal irregularities.   Lateral First Diagonal Branch   The vessel is small.   Second Diagonal Branch   The vessel is moderate in size. The vessel exhibits minimal luminal irregularities.   Third Diagonal Branch   The vessel is small. The vessel exhibits minimal luminal irregularities.   Left  Circumflex   The vessel is moderate in size.   Mid Cx to Dist Cx lesion is 40% stenosed.   First Obtuse Marginal Branch   The vessel is moderate in size. The vessel exhibits minimal luminal irregularities.   Left Posterior Descending Artery   The vessel is moderate in size.   LPDA lesion is 30% stenosed.   Second Left Posterolateral Branch   The vessel is moderate in size.   2nd LPL lesion is 35% stenosed.   Left Posterior Atrioventricular Artery   The vessel is moderate in size. The vessel exhibits minimal luminal irregularities.   Right Coronary Artery   The vessel is small.   Prox RCA lesion is 30% stenosed.   Acute Marginal Branch   The vessel is small.   Right Ventricular Branch   The vessel is moderate in size.       Intervention      No interventions have been documented.          Hemodynamics    RA --/--/24  RV 80/22  PA 80/34/50  PCWP --/--/20  Cardiac output by Umm: 6.23 L/min (indexed to 2.8L/min/m2)  Cardiac output by thermodilution: 5.6 L/min (indexed to 2.52 L/min/m2)  PVR by UMM CO: 5.8   (TD) Right sided filling pressures are severely elevated. Left sided filling pressures are mildly elevated. Severely elevated pulmonary artery hypertension. Normal cardiac output level. Hemodynamic data has been modified in Epic per physician review.             Pressures Phase: Baseline     Time Systolic (mmHg) Diastolic (mmHg) Mean (mmHg) A Wave (mmHg) V Wave (mmHg) EDP (mmHg) Max dp/dt (mmHg/sec) HR (bpm) Content (mL/dL) SAT (%)   RA Pressures  1:59 PM   22    25    25      69        RV Pressures  1:59 PM 80        25     69        PA Pressures  2:02 PM 80    30    49        69        PCW Pressures  2:00 PM   20    19    20      69        AO Pressures  2:13     50    68        69            Blood Flow Results Phase: Baseline     Time Results  Indexed Values (L/min/m2)   QP  1:35 PM 6.23 L/min    2.8      QS  1:35 PM 6.23 L/min    2.8          Blood Oximetry Phase: Baseline     Time Hb  SAT(%)  PO2   Content (mL/dL) PA Sat (%)   PA  1:35 PM  63.8 %      63.8      Art  1:35 PM  95 %     12.66           Cardiac Output Phase: Baseline     Time TDCO (L/min) TDCI (L/min/m2) Almaz C.O. (L/min) Almaz C.I. (L/min/m2) Almaz HR (bpm)   Cardiac Output Results  1:35 PM 5.6    2.52    6.23    2.8         2:03 PM 5.6              Resistance Results Phase: Baseline     Time PVR  SVR  TPR  TVR  PVR/SVR  TPR/TVR    Resistance Results (Metric)  1:35 .47 dsc-5    590.81 dsc-5    629.34 dsc-5    873.37 dsc-5    0.63    0.72      Resistance Results (Wood)  1:35 PM 4.66 CANDELARIO    7.39 CANDELARIO    7.87 CANDELARIO    10.92 CANDELARIO    0.63    0.72          Stoke Volume Results Phase: Baseline     Time RVSW (gm*m) LVSW (gm*m) RVSW-I (gm*m/m2) LVSW-I (gm*m/m2)   Stroke Work Results  1:35 PM 33.14    58.91    14.92    26.52                        Hemodynamic Waveforms -- Encounter Level:    Hemodynamic Waveforms: None found at the encounter level.    Hemodynamic Waveforms -- Order Level on 05/31/2022:    Scan on 5/31/2022 2:46 PM      Pressures Phase: Baseline     Time Systolic (mmHg) Diastolic (mmHg) Mean (mmHg) A Wave (mmHg) V Wave (mmHg) EDP (mmHg) Max dp/dt (mmHg/sec) HR (bpm) Content (mL/dL) SAT (%)   RA Pressures  1:59 PM   22    25    25      69        RV Pressures  1:59 PM 80        25     69        PA Pressures  2:02 PM 80    30    49        69        PCW Pressures  2:00 PM   20    19    20      69        AO Pressures  2:13     50    68        69            Blood Flow Results Phase: Baseline     Time Results  Indexed Values (L/min/m2)   QP  1:35 PM 6.23 L/min    2.8      QS  1:35 PM 6.23 L/min    2.8          Blood Oximetry Phase: Baseline     Time Hb  SAT(%)  PO2  Content (mL/dL) PA Sat (%)   PA  1:35 PM  63.8 %      63.8      Art  1:35 PM  95 %     12.66           Cardiac Output Phase: Baseline     Time TDCO (L/min) TDCI (L/min/m2) Almaz C.O. (L/min) Almaz C.I. (L/min/m2) Almaz HR (bpm)   Cardiac Output Results  1:35 PM 5.6    2.52    6.23     2.8         2:03 PM 5.6              Resistance Results Phase: Baseline     Time PVR  SVR  TPR  TVR  PVR/SVR  TPR/TVR    Resistance Results (Metric)  1:35 .47 dsc-5    590.81 dsc-5    629.34 dsc-5    873.37 dsc-5    0.63    0.72      Resistance Results (Wood)  1:35 PM 4.66 CANDELARIO    7.39 CANDELARIO    7.87 CANDELARIO    10.92 CANDELARIO    0.63    0.72          Stoke Volume Results Phase: Baseline     Time RVSW (gm*m) LVSW (gm*m) RVSW-I (gm*m/m2) LVSW-I (gm*m/m2)   Stroke Work Results  1:35 PM 33.14    58.91    14.92    26.52        Hemodynamic Waveforms -- Encounter Level:    Hemodynamic Waveforms: None found at the encounter level.      Again, thank you for allowing me to participate in the care of your patient.      Sincerely,    Jutso Laguerre MD

## 2024-06-05 VITALS
HEART RATE: 88 BPM | SYSTOLIC BLOOD PRESSURE: 144 MMHG | OXYGEN SATURATION: 97 % | WEIGHT: 315 LBS | HEIGHT: 68 IN | TEMPERATURE: 98 F | BODY MASS INDEX: 47.74 KG/M2 | DIASTOLIC BLOOD PRESSURE: 74 MMHG | RESPIRATION RATE: 17 BRPM

## 2024-06-05 LAB
ANION GAP SERPL CALC-SCNC: 10 MMOL/L (ref 8–16)
BASOPHILS # BLD AUTO: 0.02 K/UL (ref 0–0.2)
BASOPHILS NFR BLD: 0.3 % (ref 0–1.9)
BUN SERPL-MCNC: 11 MG/DL (ref 6–20)
CALCIUM SERPL-MCNC: 9.3 MG/DL (ref 8.7–10.5)
CHLORIDE SERPL-SCNC: 105 MMOL/L (ref 95–110)
CO2 SERPL-SCNC: 24 MMOL/L (ref 23–29)
CREAT SERPL-MCNC: 0.8 MG/DL (ref 0.5–1.4)
DIFFERENTIAL METHOD BLD: ABNORMAL
EOSINOPHIL # BLD AUTO: 0.1 K/UL (ref 0–0.5)
EOSINOPHIL NFR BLD: 1.9 % (ref 0–8)
ERYTHROCYTE [DISTWIDTH] IN BLOOD BY AUTOMATED COUNT: 13 % (ref 11.5–14.5)
EST. GFR  (NO RACE VARIABLE): >60 ML/MIN/1.73 M^2
ESTIMATED AVG GLUCOSE: 111 MG/DL (ref 68–131)
GLUCOSE SERPL-MCNC: 99 MG/DL (ref 70–110)
HBA1C MFR BLD: 5.5 % (ref 4–5.6)
HCT VFR BLD AUTO: 46.3 % (ref 40–54)
HGB BLD-MCNC: 15.4 G/DL (ref 14–18)
IMM GRANULOCYTES # BLD AUTO: 0.02 K/UL (ref 0–0.04)
IMM GRANULOCYTES NFR BLD AUTO: 0.3 % (ref 0–0.5)
LYMPHOCYTES # BLD AUTO: 1.9 K/UL (ref 1–4.8)
LYMPHOCYTES NFR BLD: 32.8 % (ref 18–48)
MAGNESIUM SERPL-MCNC: 2.1 MG/DL (ref 1.6–2.6)
MCH RBC QN AUTO: 29 PG (ref 27–31)
MCHC RBC AUTO-ENTMCNC: 33.3 G/DL (ref 32–36)
MCV RBC AUTO: 87 FL (ref 82–98)
MONOCYTES # BLD AUTO: 0.5 K/UL (ref 0.3–1)
MONOCYTES NFR BLD: 8.9 % (ref 4–15)
NEUTROPHILS # BLD AUTO: 3.2 K/UL (ref 1.8–7.7)
NEUTROPHILS NFR BLD: 55.8 % (ref 38–73)
NRBC BLD-RTO: 0 /100 WBC
PHOSPHATE SERPL-MCNC: 2.9 MG/DL (ref 2.7–4.5)
PLATELET # BLD AUTO: 248 K/UL (ref 150–450)
PMV BLD AUTO: 9.1 FL (ref 9.2–12.9)
POCT GLUCOSE: 126 MG/DL (ref 70–110)
POCT GLUCOSE: 96 MG/DL (ref 70–110)
POTASSIUM SERPL-SCNC: 4.2 MMOL/L (ref 3.5–5.1)
RBC # BLD AUTO: 5.31 M/UL (ref 4.6–6.2)
SODIUM SERPL-SCNC: 139 MMOL/L (ref 136–145)
VANCOMYCIN TROUGH SERPL-MCNC: 17.9 UG/ML (ref 10–22)
WBC # BLD AUTO: 5.74 K/UL (ref 3.9–12.7)

## 2024-06-05 PROCEDURE — 80048 BASIC METABOLIC PNL TOTAL CA: CPT | Performed by: STUDENT IN AN ORGANIZED HEALTH CARE EDUCATION/TRAINING PROGRAM

## 2024-06-05 PROCEDURE — 25000003 PHARM REV CODE 250: Performed by: STUDENT IN AN ORGANIZED HEALTH CARE EDUCATION/TRAINING PROGRAM

## 2024-06-05 PROCEDURE — 63600175 PHARM REV CODE 636 W HCPCS: Performed by: STUDENT IN AN ORGANIZED HEALTH CARE EDUCATION/TRAINING PROGRAM

## 2024-06-05 PROCEDURE — 85025 COMPLETE CBC W/AUTO DIFF WBC: CPT | Performed by: STUDENT IN AN ORGANIZED HEALTH CARE EDUCATION/TRAINING PROGRAM

## 2024-06-05 PROCEDURE — 83036 HEMOGLOBIN GLYCOSYLATED A1C: CPT | Performed by: STUDENT IN AN ORGANIZED HEALTH CARE EDUCATION/TRAINING PROGRAM

## 2024-06-05 PROCEDURE — 84100 ASSAY OF PHOSPHORUS: CPT | Performed by: STUDENT IN AN ORGANIZED HEALTH CARE EDUCATION/TRAINING PROGRAM

## 2024-06-05 PROCEDURE — 99233 SBSQ HOSP IP/OBS HIGH 50: CPT | Mod: ,,, | Performed by: STUDENT IN AN ORGANIZED HEALTH CARE EDUCATION/TRAINING PROGRAM

## 2024-06-05 PROCEDURE — 83735 ASSAY OF MAGNESIUM: CPT | Performed by: STUDENT IN AN ORGANIZED HEALTH CARE EDUCATION/TRAINING PROGRAM

## 2024-06-05 PROCEDURE — 80202 ASSAY OF VANCOMYCIN: CPT | Performed by: FAMILY MEDICINE

## 2024-06-05 RX ORDER — MUPIROCIN 20 MG/G
OINTMENT TOPICAL 3 TIMES DAILY
Qty: 22 G | Refills: 0 | Status: SHIPPED | OUTPATIENT
Start: 2024-06-05

## 2024-06-05 RX ORDER — DOXYCYCLINE HYCLATE 100 MG
100 TABLET ORAL DAILY
Qty: 9 TABLET | Refills: 0 | Status: SHIPPED | OUTPATIENT
Start: 2024-06-05 | End: 2024-06-13

## 2024-06-05 RX ORDER — CIPROFLOXACIN 500 MG/1
500 TABLET ORAL EVERY 12 HOURS
Qty: 14 TABLET | Refills: 0 | Status: SHIPPED | OUTPATIENT
Start: 2024-06-05 | End: 2024-06-12

## 2024-06-05 RX ADMIN — VANCOMYCIN HYDROCHLORIDE 2000 MG: 500 INJECTION, POWDER, LYOPHILIZED, FOR SOLUTION INTRAVENOUS at 07:06

## 2024-06-05 RX ADMIN — SENNOSIDES AND DOCUSATE SODIUM 1 TABLET: 50; 8.6 TABLET ORAL at 08:06

## 2024-06-05 RX ADMIN — THERA TABS 1 TABLET: TAB at 08:06

## 2024-06-05 RX ADMIN — ENOXAPARIN SODIUM 40 MG: 40 INJECTION SUBCUTANEOUS at 08:06

## 2024-06-05 NOTE — NURSING
AVS given to patient by staff nurse. Meds delivered to bedside. W/c transport to escort patient and belongings to main lobby.

## 2024-06-05 NOTE — PROGRESS NOTES
[facility] Surg  Podiatry  Progress Note    Patient Name: Maximino Marinelli  MRN: [id number]  Admission Date: 6/3/2024  Hospital Length of Stay: 1 days  Attending Physician: Juan Francisco Dotson,*  Primary Care Provider: Minor Cota MD     Subjective:     Interval History: VSS, HDS, NAEON. Patient reports dressing being tight. Denies any pain to RLE. Denies f/c/n/v. Denies any new pedal complaints. Patient is egaer to be d/c so he can go take care of his pets.     Scheduled Meds:   enoxparin  40 mg Subcutaneous Q12H    multivitamin  1 tablet Oral Daily    vancomycin (VANCOCIN) IV (PEDS and ADULTS)  1,500 mg Intravenous Q12H     Continuous Infusions:  PRN Meds:  Current Facility-Administered Medications:     acetaminophen, 650 mg, Oral, Q4H PRN    dextrose 10%, 12.5 g, Intravenous, PRN    dextrose 10%, 25 g, Intravenous, PRN    glucagon (human recombinant), 1 mg, Intramuscular, PRN    glucose, 16 g, Oral, PRN    glucose, 24 g, Oral, PRN    ibuprofen, 600 mg, Oral, Q6H PRN    insulin aspart U-100, 0-5 Units, Subcutaneous, QID (AC + HS) PRN    melatonin, 6 mg, Oral, Nightly PRN    naloxone, 0.02 mg, Intravenous, PRN    ondansetron, 4 mg, Intravenous, Q8H PRN    senna-docusate 8.6-50 mg, 1 tablet, Oral, BID PRN    sodium chloride 0.9%, 5 mL, Intravenous, PRN    Pharmacy to dose Vancomycin consult, , , Once **AND** vancomycin - pharmacy to dose, , Intravenous, pharmacy to manage frequency    Review of Systems   Constitutional:  Negative for fatigue and fever.   HENT: Negative.     Eyes: Negative.    Respiratory: Negative.     Cardiovascular: Negative.    Gastrointestinal: Negative.    Endocrine: Negative.    Genitourinary: Negative.    Musculoskeletal:  Positive for gait problem.   Skin:  Positive for color change and wound.        Reports redness and swelling to RLE   Allergic/Immunologic: Negative.    Hematological: Negative.    Psychiatric/Behavioral: Negative.     Objective:     Vital Signs (Most Recent):  Temp:  98.3 °F (36.8 °C) (06/05/24 0723)  Pulse: 88 (06/05/24 0723)  Resp: 18 (06/05/24 0723)  BP: (!) 142/79 (06/05/24 0723)  SpO2: 96 % (06/05/24 0723) Vital Signs (24h Range):  Temp:  [97.9 °F (36.6 °C)-98.3 °F (36.8 °C)] 98.3 °F (36.8 °C)  Pulse:  [77-89] 88  Resp:  [18-20] 18  SpO2:  [92 %-97 %] 96 %  BP: (114-142)/(56-79) 142/79     Weight: (!) 148.3 kg (326 lb 15.1 oz)  Body mass index is 49.71 kg/m².    Foot Exam     Right Foot/Ankle   Inspection and Palpation  Ecchymosis: none  Tenderness: (Tenderness to R leg and foot)  Swelling: dorsum (swelling of R leg and foot)  Skin Exam: maceration, cellulitis and abnormal color;      Neurovascular  Dorsalis pedis: doppler  Posterior tibial: doppler  Saphenous nerve sensation: diminished  Tibial nerve sensation: diminished  Superficial peroneal nerve sensation: diminished  Deep peroneal nerve sensation: diminished  Sural nerve sensation: diminished     Comments  R foot/leg: Erythema and edema noted to entire foot and leg up to the level of the knee. Mild maceration to anterior ankle/distal leg. Global xerosis of leg and foot. No open wounds noted. No purulent drainage, malodor, fluctuance, or crepitus noted.     Laboratory:  BMP:   Recent Labs   Lab 06/05/24  0556   GLU 99      K 4.2      CO2 24   BUN 11   CREATININE 0.8   CALCIUM 9.3   MG 2.1     CBC:   Recent Labs   Lab 06/05/24  0556   WBC 5.74   RBC 5.31   HGB 15.4   HCT 46.3      MCV 87   MCH 29.0   MCHC 33.3     Diagnostic Results:  I have reviewed all pertinent imaging results/findings within the past 24 hours.    Clinical Findings:          Assessment/Plan:     ID  * Cellulitis of right lower extremity  Assesment:  Podiatry consulted for RLE wounds and cellulitis. No open wound noted. Severe erythema and edema noted. XR of R foot shows no OM like changes. US venous studies show no DVT. IDSA mild foot infection. Today there continues to be maceration to distal RLE, stable.    Plan  - Physical exam  and imaging findings discussed with the patient.   - Discussed with the patient that he has no open wounds and no concerns of OM.   - Recommend continuing Abx for STI.   - Abx per primary   - Area of maceration to R anterior ankle rinsed and scrubbed with Vashe. R foot dressed with hydrofera blue, kirlex, and ACE wrap  - Recommend continuing with wound care  - Podiatry will sign off. Please reach out with any further questions.      Discharge Recommendations  - Patient to follow up in outpatient Podiatry clinic. Podiatry will schedule  - Antibiotics per Primary  - HH/SNF to do dressings 3x a week as follows: To the distal RLE in area of maceration apply Hydrofera blue, kirlex, and ACE wrap  - Weight bearing as tolerated  - Keep dressings clean, dry, and intact until follow-up appointment       Bertram Steinberg DPM  Podiatry  [place] - Med Surg

## 2024-06-05 NOTE — PROGRESS NOTES
Pharmacokinetic Assessment Follow Up: IV Vancomycin    Vancomycin serum concentration assessment(s):    The trough level was drawn correctly and can be used to guide therapy at this time. The measurement is above the desired definitive target range of 10 to 15 mcg/mL.    Vancomycin Regimen Plan:    Due to current trough and increased risk of accumulation due to patient's weight, will change to vancomycin 1500 mg IV q 12 hours and recheck vancomycin trough prior to the 3rd dose on 6/6 @ 1800.     Drug levels (last 3 results):  Recent Labs   Lab Result Units 06/05/24  0556   Vancomycin-Trough ug/mL 17.9       Pharmacy will continue to follow and monitor vancomycin.    Please contact pharmacy at extension [phone] for questions regarding this assessment.    Thank you for the consult,   Claire Moscoso       Patient brief summary:  Maximino Marinelli is a 58 y.o. male initiated on antimicrobial therapy with IV Vancomycin for treatment of skin & soft tissue infection    The patient's current regimen is vancomycin 2000 mg IV q 12 hours    Drug Allergies:   Review of patient's allergies indicates:  No Known Allergies    Actual Body Weight:   148.3 kg    Renal Function:   Estimated Creatinine Clearance: 139.4 mL/min (based on SCr of 0.8 mg/dL).,     Dialysis Method (if applicable):  N/A    CBC (last 72 hours):  Recent Labs   Lab Result Units 06/03/24  1318 06/04/24  0530 06/05/24  0556   WBC K/uL 8.79 7.49 5.74   Hemoglobin g/dL 15.9 14.6 15.4   Hemoglobin A1C %  --   --  5.5   Hematocrit % 47.5 44.8 46.3   Platelets K/uL 287 240 248   Gran % % 76.8* 61.8 55.8   Lymph % % 13.7* 26.4 32.8   Mono % % 8.3 9.5 8.9   Eosinophil % % 0.8 1.9 1.9   Basophil % % 0.2 0.3 0.3   Differential Method  Automated Automated Automated       Metabolic Panel (last 72 hours):  Recent Labs   Lab Result Units 06/03/24  1318 06/03/24  1612 06/04/24  0530 06/05/24  0556   Sodium mmol/L 140  --  139 139   Potassium mmol/L 4.2  --  4.2 4.2   Chloride mmol/L  102  --  108 105   CO2 mmol/L 29  --  21* 24   Glucose mg/dL 136*  --  107 99   Glucose, UA   --  Negative  --   --    BUN mg/dL 10  --  10 11   Creatinine mg/dL 1.0  --  0.8 0.8   Creatinine, Urine mg/dL  --  97.5  --   --    Albumin g/dL 3.4*  --  3.0*  --    Total Bilirubin mg/dL 0.5  --  0.4  --    Alkaline Phosphatase U/L 78  --  67  --    AST U/L 12  --  12  --    ALT U/L 10  --  11  --    Magnesium mg/dL  --   --  2.0 2.1   Phosphorus mg/dL  --   --  2.9 2.9       Vancomycin Administrations:  vancomycin given in the last 96 hours                     vancomycin 2 g in dextrose 5 % 500 mL IVPB (mg) 2,000 mg New Bag 06/05/24 0731     2,000 mg New Bag 06/04/24 1958     2,000 mg New Bag  0632    vancomycin (VANCOCIN) 2,500 mg in dextrose 5 % (D5W) 500 mL IVPB (mg) 2,500 mg New Bag 06/03/24 1821                    Microbiologic Results:  Microbiology Results (last 7 days)       Procedure Component Value Units Date/Time    Blood culture (site 1) [[id number]] Collected: 06/03/24 1526    Order Status: Completed Specimen: Blood from Peripheral, Upper Arm, Right Updated: 06/04/24 2212     Blood Culture, Routine No Growth to date      No Growth to date    Narrative:      Site # 1, aerobic and anaerobic    Blood Culture #1 **CANNOT BE ORDERED STAT** [[id number]] Collected: 06/03/24 1319    Order Status: Completed Specimen: Blood from Peripheral, Antecubital, Left Updated: 06/04/24 2212     Blood Culture, Routine No Growth to date      No Growth to date    Blood Culture #2 **CANNOT BE ORDERED STAT** [[id number]] Collected: 06/03/24 1347    Order Status: Completed Specimen: Blood from Peripheral, Antecubital, Right Updated: 06/04/24 2212     Blood Culture, Routine No Growth to date      No Growth to date    Blood culture (site 2) [[id number]] Collected: 06/03/24 1526    Order Status: Completed Specimen: Blood from Peripheral, Upper Arm, Right Updated: 06/04/24 4461     Blood Culture, Routine No Growth to date      No Growth  to date    Narrative:      Site # 2, aerobic only    Aerobic culture (Specify Source) **CANNOT BE ORDERED AS STAT** [[id number]] Collected: 06/03/24 1323    Order Status: Sent Specimen: Wound from Leg, Right Updated: 06/03/24 9077

## 2024-06-05 NOTE — HOSPITAL COURSE
[facility] family [facility] for cellulitis of right lower extremity and chronic skin changes to lower extremities. Pt started on Bactrim and then Vanc after initial evaluation to cover for MRSA and Gram + skin luis. Bactrim discontinued due to overlap with Vanc. Cultures taken of blood and wound. Patient vitals and labs stable throughout with no signs of systemic or deep infection. Wound had slough and was wrapped in gauze. Podiatry consulted and evaluated due to history of surgery on this R foot with foreign bodies. This led to initial concern for possible deeper infection or osteomyelitis. Podiatry did not believe there to be deeper infection and recommended wound care and oral antibiotics with follow up in clinic. Wound culture came back with Pseudomonas sp.. Pt clinically stable for discharge with oral antibiotics and close outpatient follow up. Pt referred to [facility] clinic, Podiatry, and had home health orders for help with wound care. Discussed stopping non-prescription medications and drugs, such as benzodiazepines and cocaine and seeking medical assistance with a PCP. Pt discharged with 7 days of PO Doxycycline, PO Ciprofloxacin, and Mupirocin ointment for wound care and treatment of cellulitis. Pt agreed to plan and provided with discharge instructions. Pt discharged home.

## 2024-06-05 NOTE — ASSESSMENT & PLAN NOTE
Assesment:  Podiatry consulted for RLE wounds and cellulitis. No open wound noted. Severe erythema and edema noted. XR of R foot shows no OM like changes. US venous studies show no DVT. IDSA mild foot infection. Today there continues to be maceration to distal RLE, stable.    Plan  - Physical exam and imaging findings discussed with the patient.   - Discussed with the patient that he has no open wounds and no concerns of OM.   - Recommend continuing Abx for STI.   - Abx per primary   - Area of maceration to R anterior ankle rinsed and scrubbed with Vashe. R foot dressed with hydrofera blue, kirlex, and ACE wrap  - Recommend continuing with wound care  - Podiatry will sign off. Please reach out with any further questions.      Discharge Recommendations  - Patient to follow up in outpatient Podiatry clinic. Podiatry will schedule  - Antibiotics per Primary  - HH/SNF to do dressings 3x a week as follows: To the distal RLE in area of maceration apply Hydrofera blue, kirlex, and ACE wrap  - Weight bearing as tolerated  - Keep dressings clean, dry, and intact until follow-up appointment

## 2024-06-05 NOTE — DISCHARGE SUMMARY
[facility] Medicine  Discharge Summary      Patient Name: Maximino Marinelli  MRN: [id number]  MARLI: [id number]  Patient Class: IP- Inpatient  Admission Date: 6/3/2024  Hospital Length of Stay: 1 days  Discharge Date and Time: 6/5/2024  2:06 PM  Attending Physician: No att. providers found   Discharging Provider: Lavon Payne MD  Primary Care Provider: Minor Cota MD    Primary Care Team: Networked reference to record PCT     HPI:   Patient, Maximino Marienlli, is a 58 y.o. male who presents to the ED for evaluation of pain, swelling, weeping and purulent drainage from his right lower extremity that started 6 months ago. States that it is persistent, but has gotten worse past week. Says that he has not medical care since before the pandemic. Claims history of HTN and HLD, but denies other medical conditions. No history of diabetes. Patient says that he has been self medicating with valium and occasional cocaine use for pain control. Patient says that he had a right ankle fusion with hardware also reports he has had and ankle fusion with hardware to the right lower extremity proximally 20 years ago. Patient says it is difficult to ambulate. Denies fever, chills, chest pain, shortness of breath, nausea/vomiting, or any other complaints at this time. Symptoms have been largely contained to lower extremities.    In ED, Vitals stable- /71, , Temp 98.6F, SpO2 95% on RA. Labs showed Normal CBC and CMP. Glucose 136. CRP elevated 49.8. UA normal. UDS presumptive positive for cocaine and benzodiazepines. Xrays of lower extremity negative for osteomyelitis, but showed foreign bodies. Pt admitted to [facility]  inpatient medicine service for antibiotics and specialist consultation by podiatry for wound care and possible intervention.    * No surgery found *      Hospital Course:   Pt admitted to [facility] family medicine [facility] for cellulitis of right lower extremity and chronic skin changes to lower extremities.  Pt started on Bactrim and then Vanc after initial evaluation to cover for MRSA and Gram + skin luis. Bactrim discontinued due to overlap with Vanc. Cultures taken of blood and wound. Patient vitals and labs stable throughout with no signs of systemic or deep infection. Wound had slough and was wrapped in gauze. Podiatry consulted and evaluated due to history of surgery on this R foot with foreign bodies. This led to initial concern for possible deeper infection or osteomyelitis. Podiatry did not believe there to be deeper infection and recommended wound care and oral antibiotics with follow up in clinic. Wound culture came back with Pseudomonas sp.. Pt clinically stable for discharge with oral antibiotics and close outpatient follow up. Pt referred to [facility]  clinic, Podiatry, and had home health orders for help with wound care. Discussed stopping non-prescription medications and drugs, such as benzodiazepines and cocaine and seeking medical assistance with a PCP. Pt discharged with 7 days of PO Doxycycline, PO Ciprofloxacin, and Mupirocin ointment for wound care and treatment of cellulitis. Pt agreed to plan and provided with discharge instructions. Pt discharged home.       Physical Exam  Vitals and nursing note reviewed.   Constitutional:       General: He is not in acute distress.     Appearance: He is obese. He is ill-appearing.      Interventions: He is sedated.   HENT:      Head: Normocephalic and atraumatic.   Cardiovascular:      Rate and Rhythm: Normal rate and regular rhythm.      Heart sounds: Normal heart sounds.   Pulmonary:      Effort: Pulmonary effort is normal. No respiratory distress.      Breath sounds: Normal breath sounds. No wheezing or rhonchi.   Abdominal:      General: There is no distension.      Tenderness: There is no abdominal tenderness.   Musculoskeletal:         General: Swelling, deformity and signs of injury present.      Cervical back: Normal range of motion.   Skin:     General:  Skin is warm and dry.      Findings: Erythema present.   Neurological:      Mental Status: He is oriented to person, place, and time and easily aroused.     Goals of Care Treatment Preferences:  Code Status: Full Code      Consults:   Consults (From admission, onward)          Status Ordering Provider     Inpatient consult to Podiatry  Once        Provider:  (Not yet assigned)    Completed CHRISTINA CASTRO            No new Assessment & Plan notes have been filed under this hospital service since the last note was generated.  Service: Hospital Medicine    Final Active Diagnoses:    Diagnosis Date Noted POA    PRINCIPAL PROBLEM:  Cellulitis of right lower extremity [L03.115] 06/03/2024 Yes    Lipodermatosclerosis of both lower extremities [M79.3] 06/03/2024 Yes    Substance abuse [F19.10] 12/06/2013 Yes      Problems Resolved During this Admission:       Discharged Condition: fair    Disposition: Home or Self Care    Follow Up:   Follow-up Information       [facility] Medicine Follow up.    Specialty: Family Medicine  Why: appointment requested per  Sapna  Contact information:  [address]  [place] [address] [phone]  Additional information:  Please park in [address] or D and use [address]. Take [address] elevators.             [facility]. Call.    Why: As needed, HOME HEALTH, OFFICE TO CALL PATIENT/FAMILY TO SET UP APPT TIME  Contact information:  [address] [address] [phone]                         Patient Instructions:      Ambulatory referral/consult to Wound Clinic   Standing Status: Future   Referral Priority: Routine Referral Type: Consultation   Referral Reason: Specialty Services Required   Requested Specialty: Wound Care   Number of Visits Requested: 1     Ambulatory referral/consult to [facility]   Standing Status: Future   Referral Priority: Routine Referral Type:  Consultation   Referral Reason: Specialty Services Required   Requested Specialty: Family Medicine   Number of Visits Requested: 1     SUBSEQUENT HOME HEALTH ORDERS     Order Specific Question Answer Comments   What Home Health Agency is the patient currently using? [facility]        Significant Diagnostic Studies: Labs: All labs within the past 24 hours have been reviewed  Microbiology: Wound Culture: positive for Pseudomonas aeruginosa. Susceptibilities pending, will follow up with pt once resulted.    Pending Diagnostic Studies:       None           Medications:  Reconciled Home Medications:      Medication List        START taking these medications      ciprofloxacin HCl 500 MG tablet  Commonly known as: CIPRO  Take 1 tablet (500 mg total) by mouth every 12 (twelve) hours. for 7 days     doxycycline 100 MG tablet  Commonly known as: VIBRA-TABS  Take one tablet every 12 hours the first day, then one tablet daily for 6 more days until medication is completed.     mupirocin 2 % ointment  Commonly known as: BACTROBAN  Apply topically 3 (three) times daily.            CONTINUE taking these medications      buPROPion 150 MG TB24 tablet  Commonly known as: WELLBUTRIN XL     gabapentin 600 MG tablet  Commonly known as: NEURONTIN  Take 600 mg by mouth 2 (two) times daily.            ASK your doctor about these medications      atorvastatin 10 MG tablet  Commonly known as: LIPITOR  Take 1 tablet (10 mg total) by mouth once daily.              Indwelling Lines/Drains at time of discharge:   Lines/Drains/Airways       None                   Time spent on the discharge of patient: 35 minutes         Lavon Payne MD  Department of Hospital Medicine  [facility]

## 2024-06-05 NOTE — SUBJECTIVE & OBJECTIVE
Subjective:     Interval History: VSS, HDS, NAEON. Patient reports dressing being tight. Denies any pain to RLE. Denies f/c/n/v. Denies any new pedal complaints. Patient is egaer to be d/c so he can go take care of his pets.     Scheduled Meds:   enoxparin  40 mg Subcutaneous Q12H    multivitamin  1 tablet Oral Daily    vancomycin (VANCOCIN) IV (PEDS and ADULTS)  1,500 mg Intravenous Q12H     Continuous Infusions:  PRN Meds:  Current Facility-Administered Medications:     acetaminophen, 650 mg, Oral, Q4H PRN    dextrose 10%, 12.5 g, Intravenous, PRN    dextrose 10%, 25 g, Intravenous, PRN    glucagon (human recombinant), 1 mg, Intramuscular, PRN    glucose, 16 g, Oral, PRN    glucose, 24 g, Oral, PRN    ibuprofen, 600 mg, Oral, Q6H PRN    insulin aspart U-100, 0-5 Units, Subcutaneous, QID (AC + HS) PRN    melatonin, 6 mg, Oral, Nightly PRN    naloxone, 0.02 mg, Intravenous, PRN    ondansetron, 4 mg, Intravenous, Q8H PRN    senna-docusate 8.6-50 mg, 1 tablet, Oral, BID PRN    sodium chloride 0.9%, 5 mL, Intravenous, PRN    Pharmacy to dose Vancomycin consult, , , Once **AND** vancomycin - pharmacy to dose, , Intravenous, pharmacy to manage frequency    Review of Systems   Constitutional:  Negative for fatigue and fever.   HENT: Negative.     Eyes: Negative.    Respiratory: Negative.     Cardiovascular: Negative.    Gastrointestinal: Negative.    Endocrine: Negative.    Genitourinary: Negative.    Musculoskeletal:  Positive for gait problem.   Skin:  Positive for color change and wound.        Reports redness and swelling to RLE   Allergic/Immunologic: Negative.    Hematological: Negative.    Psychiatric/Behavioral: Negative.     Objective:     Vital Signs (Most Recent):  Temp: 98.3 °F (36.8 °C) (06/05/24 0723)  Pulse: 88 (06/05/24 0723)  Resp: 18 (06/05/24 0723)  BP: (!) 142/79 (06/05/24 0723)  SpO2: 96 % (06/05/24 0723) Vital Signs (24h Range):  Temp:  [97.9 °F (36.6 °C)-98.3 °F (36.8 °C)] 98.3 °F (36.8  °C)  Pulse:  [77-89] 88  Resp:  [18-20] 18  SpO2:  [92 %-97 %] 96 %  BP: (114-142)/(56-79) 142/79     Weight: (!) 148.3 kg (326 lb 15.1 oz)  Body mass index is 49.71 kg/m².    Foot Exam     Right Foot/Ankle   Inspection and Palpation  Ecchymosis: none  Tenderness: (Tenderness to R leg and foot)  Swelling: dorsum (swelling of R leg and foot)  Skin Exam: maceration, cellulitis and abnormal color;      Neurovascular  Dorsalis pedis: doppler  Posterior tibial: doppler  Saphenous nerve sensation: diminished  Tibial nerve sensation: diminished  Superficial peroneal nerve sensation: diminished  Deep peroneal nerve sensation: diminished  Sural nerve sensation: diminished     Comments  R foot/leg: Erythema and edema noted to entire foot and leg up to the level of the knee. Mild maceration to anterior ankle/distal leg. Global xerosis of leg and foot. No open wounds noted. No purulent drainage, malodor, fluctuance, or crepitus noted.     Laboratory:  BMP:   Recent Labs   Lab 06/05/24  0556   GLU 99      K 4.2      CO2 24   BUN 11   CREATININE 0.8   CALCIUM 9.3   MG 2.1     CBC:   Recent Labs   Lab 06/05/24  0556   WBC 5.74   RBC 5.31   HGB 15.4   HCT 46.3      MCV 87   MCH 29.0   MCHC 33.3     Diagnostic Results:  I have reviewed all pertinent imaging results/findings within the past 24 hours.    Clinical Findings:

## 2024-06-05 NOTE — CARE UPDATE
[facility]      HOME HEALTH ORDERS  FACE TO FACE ENCOUNTER    Patient Name: Maximino Marinelli  YOB: 1965    PCP: Minor Cota MD   PCP Address: [address] [place] [address] / [place] [address]*  PCP Phone Number: [phone]  PCP Fax: [phone]    Encounter Date: 6/3/24    Admit to Home Health    Diagnoses:  Active Hospital Problems    Diagnosis  POA    *Cellulitis of right lower extremity [L03.115]  Yes    Lipodermatosclerosis of both lower extremities [M79.3]  Yes    Substance abuse [F19.10]  Yes      Resolved Hospital Problems   No resolved problems to display.       Follow Up Appointments:  No future appointments.    Allergies:Review of patient's allergies indicates:  No Known Allergies    Medications: Review discharge medications with patient and family and provide education.    Current Facility-Administered Medications   Medication Dose Route Frequency Provider Last Rate Last Admin    acetaminophen tablet 650 mg  650 mg Oral Q4H PRN Lavon Payne MD   650 mg at 06/04/24 1958    dextrose 10% bolus 125 mL 125 mL  12.5 g Intravenous PRN Lavon Payne MD        dextrose 10% bolus 250 mL 250 mL  25 g Intravenous PRN Lavon Payne MD        enoxaparin injection 40 mg  40 mg Subcutaneous Q12H Gopal Acosta DO   40 mg at 06/05/24 0828    glucagon (human recombinant) injection 1 mg  1 mg Intramuscular PRN Lavon Payne MD        glucose chewable tablet 16 g  16 g Oral PRN Lavon Payne MD        glucose chewable tablet 24 g  24 g Oral PRN Lavon Payne MD        ibuprofen tablet 600 mg  600 mg Oral Q6H PRN Gopal Acosta DO        insulin aspart U-100 pen 0-5 Units  0-5 Units Subcutaneous QID (AC + HS) PRN Lavon Payne MD        melatonin tablet 6 mg  6 mg Oral Nightly PRN Lavon Payne MD   6 mg at 06/04/24 1958    multivitamin tablet  1 tablet Oral Daily Lavon Payne MD   1 tablet at 06/05/24 0828    naloxone 0.4 mg/mL  injection 0.02 mg  0.02 mg Intravenous PRN Lavon Payne MD        ondansetron injection 4 mg  4 mg Intravenous Q8H PRN Lavon Payne MD        senna-docusate 8.6-50 mg per tablet 1 tablet  1 tablet Oral BID PRN Lavon Payne MD   1 tablet at 06/05/24 0837    sodium chloride 0.9% flush 5 mL  5 mL Intravenous PRN Lavon Payne MD        vancomycin - pharmacy to dose   Intravenous pharmacy to manage frequency Lavon Payne MD        vancomycin 1,500 mg in dextrose 5 % (D5W) 250 mL IVPB (Vial-Mate)  1,500 mg Intravenous Q12H Juan Francisco Dotson MD         Current Outpatient Medications   Medication Sig Dispense Refill    atorvastatin (LIPITOR) 10 MG tablet Take 1 tablet (10 mg total) by mouth once daily. (Patient not taking: Reported on 6/3/2024) 30 tablet 11    buPROPion (WELLBUTRIN XL) 150 MG TB24 tablet       ciprofloxacin HCl (CIPRO) 500 MG tablet Take 1 tablet (500 mg total) by mouth every 12 (twelve) hours. for 7 days 14 tablet 0    doxycycline (VIBRA-TABS) 100 MG tablet Take one tablet every 12 hours the first day, then one tablet daily for 6 more days until medication is completed. 9 tablet 0    gabapentin (NEURONTIN) 600 MG tablet Take 600 mg by mouth 2 (two) times daily.      mupirocin (BACTROBAN) 2 % ointment Apply topically 3 (three) times daily. 22 g 0     Discharge Medication List as of 6/5/2024  1:40 PM        START taking these medications    Details   ciprofloxacin HCl (CIPRO) 500 MG tablet Take 1 tablet (500 mg total) by mouth every 12 (twelve) hours. for 7 days, Starting Wed 6/5/2024, Until Wed 6/12/2024, Normal      doxycycline (VIBRA-TABS) 100 MG tablet Take one tablet every 12 hours the first day, then one tablet daily for 6 more days until medication is completed., Starting Wed 6/5/2024, Until u 6/13/2024, Normal      mupirocin (BACTROBAN) 2 % ointment Apply topically 3 (three) times daily., Starting Wed 6/5/2024, Normal           CONTINUE these medications which have  NOT CHANGED    Details   atorvastatin (LIPITOR) 10 MG tablet Take 1 tablet (10 mg total) by mouth once daily., Starting 1/6/2014, Until Discontinued, Normal      buPROPion (WELLBUTRIN XL) 150 MG TB24 tablet Starting 1/6/2014, Until Discontinued, Historical Med      gabapentin (NEURONTIN) 600 MG tablet Take 600 mg by mouth 2 (two) times daily., Until Discontinued, Historical Med               I have seen and examined this patient within the last 30 days. My clinical findings that support the need for the home health skilled services and home bound status are the following:no   Weakness/numbness causing balance and gait disturbance due to Infection making it taxing to leave home.  Patient with medication mismanagement issues requiring home bound status as evidenced by  Poor understanding of medication regimen/dosage and Poor adherence to medication regimen/dosage.     Diet:   regular diet      Referrals/ Consults  Physical Therapy to evaluate and treat. Evaluate for home safety and equipment needs; Establish/upgrade home exercise program. Perform / instruct on therapeutic exercises, gait training, transfer training, and Range of Motion.  Occupational Therapy to evaluate and treat. Evaluate home environment for safety and equipment needs. Perform/Instruct on transfers, ADL training, ROM, and therapeutic exercises.   to evaluate for community resources/long-range planning.  Aide to provide assistance with personal care, ADLs, and vital signs.    Activities:   activity as tolerated    Nursing:   Agency to admit patient within 24 hours of hospital discharge unless specified on physician order or at patient request    SN to complete comprehensive assessment including routine vital signs. Instruct on disease process and s/s of complications to report to MD. Review/verify medication list sent home with the patient at time of discharge  and instruct patient/caregiver as needed. Frequency may be adjusted depending  on start of care date.     Skilled nurse to perform up to 3 visits PRN for symptoms related to diagnosis    Notify MD if SBP > 160 or < 90; DBP > 90 or < 50; HR > 120 or < 50; Temp > 101; O2 < 88%; Other:   Worsening appearance of wound, discoloration, or greater purulence.    Ok to schedule additional visits based on staff availability and patient request on consecutive days within the home health episode.    When multiple disciplines ordered:    Start of Care occurs on Sunday - Wednesday schedule remaining discipline evaluations as ordered on separate consecutive days following the start of care.    Thursday SOC -schedule subsequent evaluations Friday and Monday the following week.     Friday - Saturday SOC - schedule subsequent discipline evaluations on consecutive days starting Monday of the following week.    For all post-discharge communication and subsequent orders please contact patient's primary care physician. If unable to reach primary care physician or do not receive response within 30 minutes, please contact [phone] for clinical staff order clarification      Home Health Aide:  Nursing Three times weekly and Home Health Aide Three times weekly    Wound Care Orders  yes:  HH/SNF to do dressings 3x a week as follows: To the distal RLE in area of maceration apply Hydrofera blue, kirlex, and ACE wrap    I certify that this patient is confined to his home and needs intermittent skilled nursing care.

## 2024-06-05 NOTE — PLAN OF CARE
Problem: Adult Inpatient Plan of Care  Goal: Plan of Care Review  Outcome: Progressing  Goal: Patient-Specific Goal (Individualized)  Outcome: Progressing  Goal: Absence of Hospital-Acquired Illness or Injury  Outcome: Progressing  Goal: Optimal Comfort and Wellbeing  Outcome: Progressing  Goal: Readiness for Transition of Care  Outcome: Progressing     Problem: Bariatric Environmental Safety  Goal: Safety Maintained with Care  Outcome: Progressing     Problem: Skin Injury Risk Increased  Goal: Skin Health and Integrity  Outcome: Progressing     Problem: Skin or Soft Tissue Infection  Goal: Absence of Infection Signs and Symptoms  Outcome: Progressing     Problem: Excessive Substance Use  Goal: Optimized Energy Level (Excessive Substance Use)  Outcome: Progressing  Goal: Improved Behavioral Control (Excessive Substance Use)  Outcome: Progressing  Goal: Increased Participation and Engagement (Excessive Substance Use)  Outcome: Progressing  Goal: Improved Physiologic Symptoms (Excessive Substance Use)  Outcome: Progressing  Goal: Enhanced Social, Occupational or Functional Skills (Excessive Substance Use)  Outcome: Progressing     Problem: Wound  Goal: Optimal Coping  Outcome: Progressing  Goal: Optimal Functional Ability  Outcome: Progressing  Goal: Absence of Infection Signs and Symptoms  Outcome: Progressing  Goal: Improved Oral Intake  Outcome: Progressing  Goal: Optimal Pain Control and Function  Outcome: Progressing  Goal: Skin Health and Integrity  Outcome: Progressing  Goal: Optimal Wound Healing  Outcome: Progressing

## 2024-06-05 NOTE — PLAN OF CARE
[place] [facility]  Discharge Final Note    Primary Care Provider: Minor Cota MD    Expected Discharge Date: 6/5/2024    Pharmacist will go over home medications and reasons for medications. VN and bedside nurse to reiterate final discharge instructions.     Cleared from CM . Bedside Nurse and VN notified.    Pt to drive himself home. HH with [facility].        Final Discharge Note (most recent)       Final Note - 06/05/24 1223          Final Note    Assessment Type Final Discharge Note (P)      Anticipated Discharge Disposition Home-Health Care Svc (P)      Hospital Resources/Appts/Education Provided Appointments scheduled and added to AVS;Post-Acute resouces added to AVS (P)         Post-Acute Status    Post-Acute Authorization Home Health (P)      Home Health Status Pending Clinical Review (P)    [facility] is first choice. Pending orders for DC today.    Discharge Delays None known at this time (P)                    No future appointments.      Contact Info       [facility]   Specialty: Family Medicine    [address] [place] [address], [address]  [facility] [address]   Phone: [phone]       Next Steps: Follow up    Instructions: appointment requested per  [facility] - [facility]    [address] [place] [address]   Phone: [phone]       Next Steps: Call    Instructions: As needed, HOME HEALTH, OFFICE TO CALL PATIENT/FAMILY TO SET UP APPT TIME          Orders Placed This Encounter   Procedures    Ambulatory referral/consult to Wound Clinic     Standing Status:   Future     Standing Expiration Date:   7/5/2025     Referral Priority:   Routine     Referral Type:   Consultation     Referral Reason:   Specialty Services Required     Requested Specialty:   Wound Care     Number of Visits Requested:   1    Ambulatory referral/consult to [facility]     Standing Status:   Future     Standing Expiration Date:   7/5/2025     Referral Priority:    Routine     Referral Type:   Consultation     Referral Reason:   Specialty Services Required     Requested Specialty:   Family Medicine     Number of Visits Requested:   1

## 2024-06-08 LAB
BACTERIA BLD CULT: NORMAL
BACTERIA SPEC AEROBE CULT: ABNORMAL

## 2024-07-03 ENCOUNTER — EXTERNAL HOME HEALTH (OUTPATIENT)
Dept: HOME HEALTH SERVICES | Facility: HOSPITAL | Age: 59
End: 2024-07-03
Payer: MEDICARE

## 2024-07-12 ENCOUNTER — DOCUMENT SCAN (OUTPATIENT)
Dept: HOME HEALTH SERVICES | Facility: HOSPITAL | Age: 59
End: 2024-07-12
Payer: MEDICARE

## 2024-07-25 ENCOUNTER — TELEPHONE (OUTPATIENT)
Dept: CARDIOLOGY | Facility: CLINIC | Age: 59
End: 2024-07-25
Payer: MEDICARE

## 2024-07-25 NOTE — TELEPHONE ENCOUNTER
Ilene Georges, Nicolle Duran MA  Cc: Brianne Green RN  Caller: Unspecified (Yesterday, 12:06 PM)  I spoke to Leticia.    This patient needs to see Dr. Cortez. I do not know what Ilene he was looking to speak to, but it was not me. Can you please call Leticia at the number listed below to assist with getting him scheduled with Dr. Cortez. Thank you                 ----- Message -----  From: Nicolle Paulino MA  Sent: 7/24/2024   3:51 PM CDT  To: VERO Valladares .. Is this for you???      Nw                  ----- Message -----  From: Sheldon Hair  Sent: 7/24/2024  12:09 PM CDT  To: Diego Faria Staff    Type:  call back    Who Called:leticia w/ wound yara. Specialty  Does the patient know what this is regarding?:checking on the status of a referral  Would the patient rather a call back or a response via [facility]? Call  Best Call Back Number:  Additional Information:    Would like to speak with Ilene

## 2024-07-25 NOTE — TELEPHONE ENCOUNTER
Reached out to Andrea/Wound  [phone]    In  regarding to getting   in to see  .I couldn't provide  an appointment    Time with  due to the patient has [employer] Insurance .     Andrea/Wound  asked me where pt could go with this type of insurance    I told him [facility].    Andrea/Wound  , appreciates the call back and stated he    will call Mr. Marinelli (Pt) back.        I also reached out to Patient () and explained too him as well.    Patient also appreciates the call back.      Nw                            ----- Message from Em Escamilla sent at 7/25/2024  9:35 AM CDT -----  Type:   Call    Who Called:Andrea/Wound   Does the patient know what this is regarding?:referral  Would the patient rather a call back or a response via [facility]? call  Best Call Back Number:[phone]  Additional Information: Did you receive the referral?  Please call